# Patient Record
Sex: FEMALE | Race: WHITE | Employment: FULL TIME | URBAN - METROPOLITAN AREA
[De-identification: names, ages, dates, MRNs, and addresses within clinical notes are randomized per-mention and may not be internally consistent; named-entity substitution may affect disease eponyms.]

---

## 2017-03-13 ENCOUNTER — TELEPHONE (OUTPATIENT)
Dept: INTERNAL MEDICINE CLINIC | Facility: CLINIC | Age: 32
End: 2017-03-13

## 2017-03-13 NOTE — TELEPHONE ENCOUNTER
Reason for Call/Chief Complaint:  Cough / low back pain   Onset:  Friday  Nursing Assessment/Associated Symptoms:  Pt started having a cough and low back pain Friday that lasted all weekend. This morning pt has had some diarrhea, 3 episodes so far.   Pt is

## 2017-03-13 NOTE — TELEPHONE ENCOUNTER
Most likely diarrheal an upper respiratory illness related to a virus infection. Usually resolves in about 4-7 days. Drink plenty of fluids like Gatorade and light diet–brat diet. Claritin 10 mg 1 tablet once daily.   If any persistent diarrhea will need

## 2017-03-13 NOTE — TELEPHONE ENCOUNTER
Pt states that she has a cold for the last 4 days. Pt states that she also has diarrhea and would like to know what doctor recommends, before making an appt.

## 2017-03-13 NOTE — TELEPHONE ENCOUNTER
Pt notified of RUMA message below. Pt says she has not had any symptoms since this morning and therefore does not want to keep appt for tomorrow. Pt says she will call back if symptoms return.

## 2017-11-08 ENCOUNTER — OFFICE VISIT (OUTPATIENT)
Dept: INTERNAL MEDICINE CLINIC | Facility: CLINIC | Age: 32
End: 2017-11-08

## 2017-11-08 VITALS
HEIGHT: 70 IN | SYSTOLIC BLOOD PRESSURE: 96 MMHG | HEART RATE: 78 BPM | WEIGHT: 146 LBS | BODY MASS INDEX: 20.9 KG/M2 | DIASTOLIC BLOOD PRESSURE: 64 MMHG

## 2017-11-08 DIAGNOSIS — Z00.00 ROUTINE GENERAL MEDICAL EXAMINATION AT A HEALTH CARE FACILITY: Primary | ICD-10-CM

## 2017-11-08 PROCEDURE — 99395 PREV VISIT EST AGE 18-39: CPT | Performed by: INTERNAL MEDICINE

## 2017-11-09 NOTE — PROGRESS NOTES
Eileen Encarnacion is a 29year old female.   Patient presents with:  Physical      HPI:   Pt is a 30 yo woman comes for physical  C/c physical  C/o   After breast feeding -- took 3 mns for period to return and then she period 3-4 days - painful ,heavy ,no cl Position: Sitting, Cuff Size: adult)   Pulse 78   Ht 5' 10\" (1.778 m)   Wt 146 lb (66.2 kg)   LMP 10/15/2017   BMI 20.95 kg/m²   GENERAL: well developed, well nourished,in no apparent distress  SKIN: no rashes,no suspicious lesions  HEENT: atraumatic, nor

## 2017-12-28 ENCOUNTER — TELEPHONE (OUTPATIENT)
Dept: INTERNAL MEDICINE CLINIC | Facility: CLINIC | Age: 32
End: 2017-12-28

## 2017-12-29 NOTE — TELEPHONE ENCOUNTER
Please advise on message.   Last OV with you was 10/28/2016, Last ov was with Dr. Ryan Edwards 11/8/2017

## 2018-01-02 NOTE — TELEPHONE ENCOUNTER
FYI: per pt, she saw Dr Oracio Machado RUMA,  Pt would like to know if msg can send to Dr Padmini Ivey since she discuss it to her already about this issue. Pls advise.

## 2018-01-03 NOTE — TELEPHONE ENCOUNTER
Dr Lito Saenz please see request to check estrogen. Pt saw you 11/8, will remind her to have other labs checked as well.

## 2018-02-03 ENCOUNTER — OFFICE VISIT (OUTPATIENT)
Dept: INTERNAL MEDICINE CLINIC | Facility: CLINIC | Age: 33
End: 2018-02-03

## 2018-02-03 ENCOUNTER — LAB ENCOUNTER (OUTPATIENT)
Dept: LAB | Facility: HOSPITAL | Age: 33
End: 2018-02-03
Attending: INTERNAL MEDICINE
Payer: COMMERCIAL

## 2018-02-03 VITALS
HEART RATE: 61 BPM | HEIGHT: 70 IN | SYSTOLIC BLOOD PRESSURE: 106 MMHG | WEIGHT: 148 LBS | DIASTOLIC BLOOD PRESSURE: 66 MMHG | BODY MASS INDEX: 21.19 KG/M2

## 2018-02-03 DIAGNOSIS — H61.21 IMPACTED CERUMEN OF RIGHT EAR: Primary | ICD-10-CM

## 2018-02-03 DIAGNOSIS — Z00.00 ROUTINE GENERAL MEDICAL EXAMINATION AT A HEALTH CARE FACILITY: ICD-10-CM

## 2018-02-03 LAB
ALBUMIN SERPL BCP-MCNC: 4.1 G/DL (ref 3.5–4.8)
ALBUMIN/GLOB SERPL: 1.5 {RATIO} (ref 1–2)
ALP SERPL-CCNC: 34 U/L (ref 32–100)
ALT SERPL-CCNC: 11 U/L (ref 14–54)
ANION GAP SERPL CALC-SCNC: 10 MMOL/L (ref 0–18)
AST SERPL-CCNC: 14 U/L (ref 15–41)
BASOPHILS # BLD: 0 K/UL (ref 0–0.2)
BASOPHILS NFR BLD: 0 %
BILIRUB SERPL-MCNC: 0.7 MG/DL (ref 0.3–1.2)
BUN SERPL-MCNC: 10 MG/DL (ref 8–20)
BUN/CREAT SERPL: 13.2 (ref 10–20)
CALCIUM SERPL-MCNC: 9 MG/DL (ref 8.5–10.5)
CHLORIDE SERPL-SCNC: 105 MMOL/L (ref 95–110)
CHOLEST SERPL-MCNC: 98 MG/DL (ref 110–200)
CO2 SERPL-SCNC: 24 MMOL/L (ref 22–32)
CREAT SERPL-MCNC: 0.76 MG/DL (ref 0.5–1.5)
EOSINOPHIL # BLD: 0.1 K/UL (ref 0–0.7)
EOSINOPHIL NFR BLD: 2 %
ERYTHROCYTE [DISTWIDTH] IN BLOOD BY AUTOMATED COUNT: 13.1 % (ref 11–15)
GLOBULIN PLAS-MCNC: 2.7 G/DL (ref 2.5–3.7)
GLUCOSE SERPL-MCNC: 96 MG/DL (ref 70–99)
HCT VFR BLD AUTO: 37.2 % (ref 35–48)
HDLC SERPL-MCNC: 51 MG/DL
HGB BLD-MCNC: 12.5 G/DL (ref 12–16)
LDLC SERPL CALC-MCNC: 43 MG/DL (ref 0–99)
LYMPHOCYTES # BLD: 1.1 K/UL (ref 1–4)
LYMPHOCYTES NFR BLD: 27 %
MCH RBC QN AUTO: 29.7 PG (ref 27–32)
MCHC RBC AUTO-ENTMCNC: 33.6 G/DL (ref 32–37)
MCV RBC AUTO: 88.4 FL (ref 80–100)
MONOCYTES # BLD: 0.4 K/UL (ref 0–1)
MONOCYTES NFR BLD: 10 %
NEUTROPHILS # BLD AUTO: 2.6 K/UL (ref 1.8–7.7)
NEUTROPHILS NFR BLD: 62 %
NONHDLC SERPL-MCNC: 47 MG/DL
OSMOLALITY UR CALC.SUM OF ELEC: 287 MOSM/KG (ref 275–295)
PLATELET # BLD AUTO: 131 K/UL (ref 140–400)
PMV BLD AUTO: 10.1 FL (ref 7.4–10.3)
POTASSIUM SERPL-SCNC: 4.2 MMOL/L (ref 3.3–5.1)
PROT SERPL-MCNC: 6.8 G/DL (ref 5.9–8.4)
RBC # BLD AUTO: 4.21 M/UL (ref 3.7–5.4)
SODIUM SERPL-SCNC: 139 MMOL/L (ref 136–144)
TRIGL SERPL-MCNC: 18 MG/DL (ref 1–149)
TSH SERPL-ACNC: 1.64 UIU/ML (ref 0.45–5.33)
VIT B12 SERPL-MCNC: 332 PG/ML (ref 181–914)
WBC # BLD AUTO: 4.3 K/UL (ref 4–11)

## 2018-02-03 PROCEDURE — 99213 OFFICE O/P EST LOW 20 MIN: CPT | Performed by: INTERNAL MEDICINE

## 2018-02-03 PROCEDURE — 82306 VITAMIN D 25 HYDROXY: CPT

## 2018-02-03 PROCEDURE — 36415 COLL VENOUS BLD VENIPUNCTURE: CPT

## 2018-02-03 PROCEDURE — 80061 LIPID PANEL: CPT

## 2018-02-03 PROCEDURE — 99212 OFFICE O/P EST SF 10 MIN: CPT | Performed by: INTERNAL MEDICINE

## 2018-02-03 PROCEDURE — 85025 COMPLETE CBC W/AUTO DIFF WBC: CPT

## 2018-02-03 PROCEDURE — 84443 ASSAY THYROID STIM HORMONE: CPT

## 2018-02-03 PROCEDURE — 82607 VITAMIN B-12: CPT

## 2018-02-03 PROCEDURE — 80053 COMPREHEN METABOLIC PANEL: CPT

## 2018-02-03 NOTE — PROGRESS NOTES
Nellie Rodriguez is a 29year old female. Patient presents with:  Ear Wax    HPI:   At her recent physical, it was noted that she had some wax buildup in her right ear. Recently she has felt that her right ear is \"clogged. \"  She has noted some hearing l

## 2018-02-05 LAB — 25(OH)D3 SERPL-MCNC: 21.1 NG/ML

## 2018-02-13 ENCOUNTER — TELEPHONE (OUTPATIENT)
Dept: OBGYN CLINIC | Facility: CLINIC | Age: 33
End: 2018-02-13

## 2018-02-13 DIAGNOSIS — O20.0 THREATENED ABORTION, ANTEPARTUM: Primary | ICD-10-CM

## 2018-02-14 NOTE — TELEPHONE ENCOUNTER
Pt calling to report LMP of 1/11/18 and +HPT. Pt stated that she usually has regular cycles, usually begin on the 1st of each month. Pt stated that for the past 2 months her cycles have becoming \"somewhat longer\".  Pt stated that in December her period wa

## 2018-02-15 ENCOUNTER — APPOINTMENT (OUTPATIENT)
Dept: LAB | Facility: HOSPITAL | Age: 33
End: 2018-02-15
Attending: OBSTETRICS & GYNECOLOGY
Payer: COMMERCIAL

## 2018-02-15 ENCOUNTER — TELEPHONE (OUTPATIENT)
Dept: OBGYN CLINIC | Facility: CLINIC | Age: 33
End: 2018-02-15

## 2018-02-15 DIAGNOSIS — O20.0 THREATENED ABORTION, ANTEPARTUM: ICD-10-CM

## 2018-02-15 LAB
B-HCG SERPL-ACNC: 342.5 MIU/ML
RH BLOOD TYPE: POSITIVE

## 2018-02-15 PROCEDURE — 86900 BLOOD TYPING SEROLOGIC ABO: CPT

## 2018-02-15 PROCEDURE — 84702 CHORIONIC GONADOTROPIN TEST: CPT

## 2018-02-15 PROCEDURE — 86901 BLOOD TYPING SEROLOGIC RH(D): CPT

## 2018-02-15 PROCEDURE — 36415 COLL VENOUS BLD VENIPUNCTURE: CPT

## 2018-02-15 NOTE — TELEPHONE ENCOUNTER
Per LYNETTE pt to also have blood type drawn when she comes in for first [address]. Pt advised of all LYNETTE's recs and states understanding.

## 2018-02-15 NOTE — TELEPHONE ENCOUNTER
Was advised to come in for quant and repeat in 48 hrs. States does not want to \"sit in traffic and works [place] the city\" and will \"work better with work schedule\" to come in on Saturday for first [address] and repeat it on Monday when she is off from work.

## 2018-02-15 NOTE — TELEPHONE ENCOUNTER
Informed pt of below, she will \"try to go later tonight\". Advised her to come back for second quant Sunday morning since the lab will be closed saturday night. Pt states she will try.

## 2018-02-15 NOTE — TELEPHONE ENCOUNTER
The pt states that she is suppose to come in today for lab work, but would like to know if it is ok for her to wait until Saturday to have it done. Please advise.

## 2018-02-15 NOTE — TELEPHONE ENCOUNTER
The risk in waiting is not knowing blood type and Ab screen to see if Rhogam is indicated. I recommend she come ASAP. Let her know.

## 2018-02-18 ENCOUNTER — TELEPHONE (OUTPATIENT)
Dept: OBGYN CLINIC | Facility: CLINIC | Age: 33
End: 2018-02-18

## 2018-02-18 ENCOUNTER — APPOINTMENT (OUTPATIENT)
Dept: LAB | Facility: HOSPITAL | Age: 33
End: 2018-02-18
Attending: OBSTETRICS & GYNECOLOGY
Payer: COMMERCIAL

## 2018-02-18 ENCOUNTER — PATIENT MESSAGE (OUTPATIENT)
Dept: INTERNAL MEDICINE CLINIC | Facility: CLINIC | Age: 33
End: 2018-02-18

## 2018-02-18 DIAGNOSIS — O20.0 THREATENED ABORTION, ANTEPARTUM: ICD-10-CM

## 2018-02-18 DIAGNOSIS — O20.0 THREATENED ABORTION, ANTEPARTUM: Primary | ICD-10-CM

## 2018-02-18 LAB — B-HCG SERPL-ACNC: 380.6 MIU/ML

## 2018-02-18 PROCEDURE — 84702 CHORIONIC GONADOTROPIN TEST: CPT

## 2018-02-18 PROCEDURE — 36415 COLL VENOUS BLD VENIPUNCTURE: CPT

## 2018-02-18 NOTE — TELEPHONE ENCOUNTER
Patient presented to  at [facility]. She was supposed to have 2nd HCG level and states no order. I reviewed order from 02-15-18. I can't tell if this was placed as a standing order or not.  Regardless, I placed order for second HCG and patient will be d

## 2018-02-21 ENCOUNTER — TELEPHONE (OUTPATIENT)
Dept: OTHER | Age: 33
End: 2018-02-21

## 2018-02-21 NOTE — TELEPHONE ENCOUNTER
Pt returned call. Pt states she can contact Dr. Saleem Garzon via [address] I advised to call Dr. Masood Smith office directly so that they can assist her. Number given to Pt.

## 2018-02-21 NOTE — TELEPHONE ENCOUNTER
Pt contacted and states that she just spoke with someone in the office and was informed of the message to contact Dr. Del Stokes office directly. Pt verbalizes understanding and states that the vaginal bleeding has stopped and that she feels fine.

## 2018-02-21 NOTE — TELEPHONE ENCOUNTER
From: Ronal España  To: Greyson Villa MD  Sent: 2/18/2018 8:52 AM CST  Subject: Referral Request    Dear Dr. Nicki Alvarez,    Dr. Elayne Mercado is going to be my Obgyn. I can’t send him a message on my chart. Please let him know.  After heavy implantation bleeding

## 2018-02-21 NOTE — TELEPHONE ENCOUNTER
Raleigh Vickers,  I tried calling you by phone and left a message. Please contact Dr. Luís Falcon office directly for assistance. If you need advise please call our office directly so we can further assist you at [phone].     [address]

## 2018-02-21 NOTE — TELEPHONE ENCOUNTER
REY..Dr. Jose Lindo, See [facility] message, I asked pt to call Dr. Christoph Contreras office directly so their nurse can assist her but if she needs us to please call us. I also tried calling her at home and was only able to leave a message.

## 2018-02-22 ENCOUNTER — OFFICE VISIT (OUTPATIENT)
Dept: OBGYN CLINIC | Facility: CLINIC | Age: 33
End: 2018-02-22

## 2018-02-22 ENCOUNTER — TELEPHONE (OUTPATIENT)
Dept: OBGYN CLINIC | Facility: CLINIC | Age: 33
End: 2018-02-22

## 2018-02-22 ENCOUNTER — APPOINTMENT (OUTPATIENT)
Dept: LAB | Facility: HOSPITAL | Age: 33
End: 2018-02-22
Attending: OBSTETRICS & GYNECOLOGY
Payer: COMMERCIAL

## 2018-02-22 VITALS
WEIGHT: 147 LBS | HEART RATE: 74 BPM | SYSTOLIC BLOOD PRESSURE: 115 MMHG | DIASTOLIC BLOOD PRESSURE: 75 MMHG | BODY MASS INDEX: 21 KG/M2

## 2018-02-22 DIAGNOSIS — O20.0 THREATENED ABORTION, ANTEPARTUM: ICD-10-CM

## 2018-02-22 DIAGNOSIS — O20.0 THREATENED ABORTION, ANTEPARTUM: Primary | ICD-10-CM

## 2018-02-22 DIAGNOSIS — O03.4 INCOMPLETE SPONTANEOUS ABORTION: Primary | ICD-10-CM

## 2018-02-22 LAB
ALBUMIN SERPL BCP-MCNC: 4.7 G/DL (ref 3.5–4.8)
ALBUMIN/GLOB SERPL: 1.6 {RATIO} (ref 1–2)
ALP SERPL-CCNC: 36 U/L (ref 32–100)
ALT SERPL-CCNC: 12 U/L (ref 14–54)
ANION GAP SERPL CALC-SCNC: 9 MMOL/L (ref 0–18)
AST SERPL-CCNC: 14 U/L (ref 15–41)
B-HCG SERPL-ACNC: 86.5 MIU/ML
BILIRUB SERPL-MCNC: 1 MG/DL (ref 0.3–1.2)
BUN SERPL-MCNC: 6 MG/DL (ref 8–20)
BUN/CREAT SERPL: 8.2 (ref 10–20)
CALCIUM SERPL-MCNC: 9.5 MG/DL (ref 8.5–10.5)
CHLORIDE SERPL-SCNC: 107 MMOL/L (ref 95–110)
CO2 SERPL-SCNC: 24 MMOL/L (ref 22–32)
CREAT SERPL-MCNC: 0.73 MG/DL (ref 0.5–1.5)
ERYTHROCYTE [DISTWIDTH] IN BLOOD BY AUTOMATED COUNT: 13.1 % (ref 11–15)
GLOBULIN PLAS-MCNC: 3 G/DL (ref 2.5–3.7)
GLUCOSE SERPL-MCNC: 90 MG/DL (ref 70–99)
HCT VFR BLD AUTO: 41 % (ref 35–48)
HGB BLD-MCNC: 13.8 G/DL (ref 12–16)
MCH RBC QN AUTO: 30 PG (ref 27–32)
MCHC RBC AUTO-ENTMCNC: 33.7 G/DL (ref 32–37)
MCV RBC AUTO: 88.9 FL (ref 80–100)
OSMOLALITY UR CALC.SUM OF ELEC: 287 MOSM/KG (ref 275–295)
PATIENT FASTING: NO
PLATELET # BLD AUTO: 152 K/UL (ref 140–400)
PMV BLD AUTO: 9.7 FL (ref 7.4–10.3)
POTASSIUM SERPL-SCNC: 4 MMOL/L (ref 3.3–5.1)
PROT SERPL-MCNC: 7.7 G/DL (ref 5.9–8.4)
RBC # BLD AUTO: 4.61 M/UL (ref 3.7–5.4)
SODIUM SERPL-SCNC: 140 MMOL/L (ref 136–144)
WBC # BLD AUTO: 6.8 K/UL (ref 4–11)

## 2018-02-22 PROCEDURE — 80053 COMPREHEN METABOLIC PANEL: CPT

## 2018-02-22 PROCEDURE — 85027 COMPLETE CBC AUTOMATED: CPT

## 2018-02-22 PROCEDURE — 84702 CHORIONIC GONADOTROPIN TEST: CPT

## 2018-02-22 PROCEDURE — 99213 OFFICE O/P EST LOW 20 MIN: CPT | Performed by: OBSTETRICS & GYNECOLOGY

## 2018-02-22 PROCEDURE — 36415 COLL VENOUS BLD VENIPUNCTURE: CPT

## 2018-02-22 RX ORDER — PRENATAL VIT/IRON FUM/FOLIC AC 27MG-0.8MG
1 TABLET ORAL DAILY
COMMUNITY

## 2018-02-22 NOTE — TELEPHONE ENCOUNTER
Pt accepted appt with LYNETTE in [place] at 10:20am this morning. Pt provided with [place] address. Pt advised to call with any worsening bleeding or any concerns prior to appt. Pt verbalized understanding.

## 2018-02-22 NOTE — PROGRESS NOTES
HPI:    Patient ID: Bettie Canchola is a 29year old female. HPI   with sure LMP of 18 but menses q 21-42 days prior to that. Positive HPT on .  Spotting - and then post coital spotting after IC about 2 weeks later but over 3 days. With no pain, we can draw HCG again. I discussed possible non-viable uterine pregnancy vs ectopic. Strict instructions to call day or night with pain. We lastly discussed possible MTX if level is still hovering.  I explained method, indications

## 2018-02-22 NOTE — TELEPHONE ENCOUNTER
Paged while on call. Pt with brown spotting when wiping this morning. She denies heavy bleeding. She had bleeding last week after intercourse and inappropriate rise of HCG. She denies pain, lightheadedness and dizziness.  Reviewed patient needs to be seen i

## 2018-02-22 NOTE — TELEPHONE ENCOUNTER
I discussed HCG level decrease to 86. This is good news. I advised weekly quants to zero and new order sent. Maintain pain / bleeding instructions TCB.

## 2018-03-03 ENCOUNTER — TELEPHONE (OUTPATIENT)
Dept: OBGYN CLINIC | Facility: CLINIC | Age: 33
End: 2018-03-03

## 2018-03-03 ENCOUNTER — APPOINTMENT (OUTPATIENT)
Dept: LAB | Facility: HOSPITAL | Age: 33
End: 2018-03-03
Attending: OBSTETRICS & GYNECOLOGY
Payer: COMMERCIAL

## 2018-03-03 ENCOUNTER — APPOINTMENT (OUTPATIENT)
Dept: ULTRASOUND IMAGING | Facility: HOSPITAL | Age: 33
End: 2018-03-03
Attending: EMERGENCY MEDICINE
Payer: COMMERCIAL

## 2018-03-03 ENCOUNTER — HOSPITAL ENCOUNTER (OUTPATIENT)
Facility: HOSPITAL | Age: 33
Setting detail: OBSERVATION
Discharge: HOME | End: 2018-03-04
Attending: EMERGENCY MEDICINE | Admitting: OBSTETRICS & GYNECOLOGY
Payer: COMMERCIAL

## 2018-03-03 DIAGNOSIS — N83.202 CYSTS OF BOTH OVARIES: ICD-10-CM

## 2018-03-03 DIAGNOSIS — R10.2 PELVIC PAIN AFFECTING PREGNANCY: ICD-10-CM

## 2018-03-03 DIAGNOSIS — N83.201 CYSTS OF BOTH OVARIES: ICD-10-CM

## 2018-03-03 DIAGNOSIS — O26.899 PELVIC PAIN AFFECTING PREGNANCY, ANTEPARTUM: Primary | ICD-10-CM

## 2018-03-03 DIAGNOSIS — N83.201 BILATERAL OVARIAN CYSTS: ICD-10-CM

## 2018-03-03 DIAGNOSIS — R10.2 PELVIC PAIN AFFECTING PREGNANCY, ANTEPARTUM: Primary | ICD-10-CM

## 2018-03-03 DIAGNOSIS — N83.202 BILATERAL OVARIAN CYSTS: ICD-10-CM

## 2018-03-03 DIAGNOSIS — O26.899 PELVIC PAIN AFFECTING PREGNANCY: ICD-10-CM

## 2018-03-03 DIAGNOSIS — O03.4 INCOMPLETE SPONTANEOUS ABORTION: ICD-10-CM

## 2018-03-03 LAB
B-HCG SERPL-ACNC: 221.4 MIU/ML
ERYTHROCYTE [DISTWIDTH] IN BLOOD BY AUTOMATED COUNT: 12.7 % (ref 11–15)
HCT VFR BLD AUTO: 35.4 % (ref 35–48)
HGB BLD-MCNC: 12.1 G/DL (ref 12–16)
MCH RBC QN AUTO: 30.1 PG (ref 27–32)
MCHC RBC AUTO-ENTMCNC: 34.1 G/DL (ref 32–37)
MCV RBC AUTO: 88.3 FL (ref 80–100)
PLATELET # BLD AUTO: 139 K/UL (ref 140–400)
PMV BLD AUTO: 9.9 FL (ref 7.4–10.3)
RBC # BLD AUTO: 4.01 M/UL (ref 3.7–5.4)
WBC # BLD AUTO: 8 K/UL (ref 4–11)

## 2018-03-03 PROCEDURE — 76801 OB US < 14 WKS SINGLE FETUS: CPT | Performed by: EMERGENCY MEDICINE

## 2018-03-03 PROCEDURE — 84702 CHORIONIC GONADOTROPIN TEST: CPT

## 2018-03-03 PROCEDURE — 93975 VASCULAR STUDY: CPT | Performed by: EMERGENCY MEDICINE

## 2018-03-03 PROCEDURE — 36415 COLL VENOUS BLD VENIPUNCTURE: CPT

## 2018-03-03 PROCEDURE — 76817 TRANSVAGINAL US OBSTETRIC: CPT | Performed by: EMERGENCY MEDICINE

## 2018-03-03 RX ORDER — IBUPROFEN 600 MG/1
600 TABLET ORAL ONCE
Status: DISCONTINUED | OUTPATIENT
Start: 2018-03-03 | End: 2018-03-03

## 2018-03-03 RX ORDER — DEXTROSE, SODIUM CHLORIDE, SODIUM LACTATE, POTASSIUM CHLORIDE, AND CALCIUM CHLORIDE 5; .6; .31; .03; .02 G/100ML; G/100ML; G/100ML; G/100ML; G/100ML
INJECTION, SOLUTION INTRAVENOUS CONTINUOUS
Status: DISCONTINUED | OUTPATIENT
Start: 2018-03-03 | End: 2018-03-04

## 2018-03-03 RX ORDER — ACETAMINOPHEN 500 MG
1000 TABLET ORAL ONCE
Status: COMPLETED | OUTPATIENT
Start: 2018-03-03 | End: 2018-03-03

## 2018-03-04 ENCOUNTER — ANESTHESIA (OUTPATIENT)
Dept: SURGERY | Facility: HOSPITAL | Age: 33
End: 2018-03-04
Payer: COMMERCIAL

## 2018-03-04 ENCOUNTER — SURGERY (OUTPATIENT)
Age: 33
End: 2018-03-04

## 2018-03-04 ENCOUNTER — ANESTHESIA EVENT (OUTPATIENT)
Dept: SURGERY | Facility: HOSPITAL | Age: 33
End: 2018-03-04
Payer: COMMERCIAL

## 2018-03-04 ENCOUNTER — TELEPHONE (OUTPATIENT)
Dept: OBGYN CLINIC | Facility: CLINIC | Age: 33
End: 2018-03-04

## 2018-03-04 VITALS
SYSTOLIC BLOOD PRESSURE: 114 MMHG | TEMPERATURE: 97 F | WEIGHT: 145 LBS | OXYGEN SATURATION: 100 % | RESPIRATION RATE: 18 BRPM | HEART RATE: 78 BPM | HEIGHT: 69 IN | BODY MASS INDEX: 21.48 KG/M2 | DIASTOLIC BLOOD PRESSURE: 55 MMHG

## 2018-03-04 PROBLEM — O26.899 PELVIC PAIN AFFECTING PREGNANCY, ANTEPARTUM: Status: ACTIVE | Noted: 2018-03-04

## 2018-03-04 PROBLEM — O00.101 RIGHT TUBAL PREGNANCY WITHOUT INTRAUTERINE PREGNANCY: Status: ACTIVE | Noted: 2018-03-04

## 2018-03-04 PROBLEM — N83.202 CYSTS OF BOTH OVARIES: Status: ACTIVE | Noted: 2018-03-04

## 2018-03-04 PROBLEM — R10.2 PELVIC PAIN AFFECTING PREGNANCY, ANTEPARTUM: Status: ACTIVE | Noted: 2018-03-04

## 2018-03-04 PROBLEM — N83.201 CYSTS OF BOTH OVARIES: Status: ACTIVE | Noted: 2018-03-04

## 2018-03-04 LAB
ANTIBODY SCREEN: NEGATIVE
RH BLOOD TYPE: POSITIVE

## 2018-03-04 PROCEDURE — 58662 LAPAROSCOPY EXCISE LESIONS: CPT | Performed by: OBSTETRICS & GYNECOLOGY

## 2018-03-04 PROCEDURE — 0UB24ZZ EXCISION OF BILATERAL OVARIES, PERCUTANEOUS ENDOSCOPIC APPROACH: ICD-10-PCS | Performed by: OBSTETRICS & GYNECOLOGY

## 2018-03-04 PROCEDURE — 0UT54ZZ RESECTION OF RIGHT FALLOPIAN TUBE, PERCUTANEOUS ENDOSCOPIC APPROACH: ICD-10-PCS | Performed by: OBSTETRICS & GYNECOLOGY

## 2018-03-04 PROCEDURE — 10T24ZZ RESECTION OF PRODUCTS OF CONCEPTION, ECTOPIC, PERCUTANEOUS ENDOSCOPIC APPROACH: ICD-10-PCS | Performed by: OBSTETRICS & GYNECOLOGY

## 2018-03-04 PROCEDURE — 59151 TREAT ECTOPIC PREGNANCY: CPT | Performed by: OBSTETRICS & GYNECOLOGY

## 2018-03-04 RX ORDER — HYDROCODONE BITARTRATE AND ACETAMINOPHEN 5; 325 MG/1; MG/1
1 TABLET ORAL AS NEEDED
Status: DISCONTINUED | OUTPATIENT
Start: 2018-03-04 | End: 2018-03-04 | Stop reason: HOSPADM

## 2018-03-04 RX ORDER — MORPHINE SULFATE 10 MG/ML
6 INJECTION, SOLUTION INTRAMUSCULAR; INTRAVENOUS EVERY 10 MIN PRN
Status: DISCONTINUED | OUTPATIENT
Start: 2018-03-04 | End: 2018-03-04 | Stop reason: HOSPADM

## 2018-03-04 RX ORDER — CLINDAMYCIN PHOSPHATE 150 MG/ML
INJECTION, SOLUTION INTRAVENOUS AS NEEDED
Status: DISCONTINUED | OUTPATIENT
Start: 2018-03-04 | End: 2018-03-04 | Stop reason: SURG

## 2018-03-04 RX ORDER — SODIUM CHLORIDE, SODIUM LACTATE, POTASSIUM CHLORIDE, CALCIUM CHLORIDE 600; 310; 30; 20 MG/100ML; MG/100ML; MG/100ML; MG/100ML
INJECTION, SOLUTION INTRAVENOUS CONTINUOUS PRN
Status: DISCONTINUED | OUTPATIENT
Start: 2018-03-04 | End: 2018-03-04 | Stop reason: SURG

## 2018-03-04 RX ORDER — CLINDAMYCIN PHOSPHATE 900 MG/50ML
900 INJECTION INTRAVENOUS ONCE
Status: DISCONTINUED | OUTPATIENT
Start: 2018-03-04 | End: 2018-03-04 | Stop reason: HOSPADM

## 2018-03-04 RX ORDER — SODIUM CHLORIDE, SODIUM LACTATE, POTASSIUM CHLORIDE, CALCIUM CHLORIDE 600; 310; 30; 20 MG/100ML; MG/100ML; MG/100ML; MG/100ML
INJECTION, SOLUTION INTRAVENOUS CONTINUOUS
Status: DISCONTINUED | OUTPATIENT
Start: 2018-03-04 | End: 2018-03-04 | Stop reason: HOSPADM

## 2018-03-04 RX ORDER — LIDOCAINE HYDROCHLORIDE 10 MG/ML
INJECTION, SOLUTION EPIDURAL; INFILTRATION; INTRACAUDAL; PERINEURAL AS NEEDED
Status: DISCONTINUED | OUTPATIENT
Start: 2018-03-04 | End: 2018-03-04 | Stop reason: SURG

## 2018-03-04 RX ORDER — SODIUM CHLORIDE 0.9 % (FLUSH) 0.9 %
10 SYRINGE (ML) INJECTION AS NEEDED
Status: DISCONTINUED | OUTPATIENT
Start: 2018-03-04 | End: 2018-03-04 | Stop reason: HOSPADM

## 2018-03-04 RX ORDER — LABETALOL HYDROCHLORIDE 5 MG/ML
INJECTION, SOLUTION INTRAVENOUS AS NEEDED
Status: DISCONTINUED | OUTPATIENT
Start: 2018-03-04 | End: 2018-03-04 | Stop reason: SURG

## 2018-03-04 RX ORDER — MIDAZOLAM HYDROCHLORIDE 1 MG/ML
INJECTION INTRAMUSCULAR; INTRAVENOUS AS NEEDED
Status: DISCONTINUED | OUTPATIENT
Start: 2018-03-04 | End: 2018-03-04 | Stop reason: SURG

## 2018-03-04 RX ORDER — DEXAMETHASONE SODIUM PHOSPHATE 4 MG/ML
VIAL (ML) INJECTION AS NEEDED
Status: DISCONTINUED | OUTPATIENT
Start: 2018-03-04 | End: 2018-03-04 | Stop reason: SURG

## 2018-03-04 RX ORDER — ONDANSETRON 2 MG/ML
INJECTION INTRAMUSCULAR; INTRAVENOUS AS NEEDED
Status: DISCONTINUED | OUTPATIENT
Start: 2018-03-04 | End: 2018-03-04 | Stop reason: SURG

## 2018-03-04 RX ORDER — BUPIVACAINE HYDROCHLORIDE AND EPINEPHRINE 5; 5 MG/ML; UG/ML
INJECTION, SOLUTION PERINEURAL AS NEEDED
Status: DISCONTINUED | OUTPATIENT
Start: 2018-03-04 | End: 2018-03-04 | Stop reason: HOSPADM

## 2018-03-04 RX ORDER — IBUPROFEN 600 MG/1
600 TABLET ORAL EVERY 6 HOURS PRN
Qty: 30 TABLET | Refills: 0 | Status: SHIPPED | OUTPATIENT
Start: 2018-03-04 | End: 2018-07-16 | Stop reason: ALTCHOICE

## 2018-03-04 RX ORDER — ONDANSETRON 2 MG/ML
4 INJECTION INTRAMUSCULAR; INTRAVENOUS EVERY 8 HOURS PRN
Status: DISCONTINUED | OUTPATIENT
Start: 2018-03-04 | End: 2018-03-04

## 2018-03-04 RX ORDER — ONDANSETRON 4 MG/1
4 TABLET, FILM COATED ORAL EVERY 8 HOURS PRN
Status: DISCONTINUED | OUTPATIENT
Start: 2018-03-04 | End: 2018-03-04

## 2018-03-04 RX ORDER — SODIUM CHLORIDE, SODIUM LACTATE, POTASSIUM CHLORIDE, CALCIUM CHLORIDE 600; 310; 30; 20 MG/100ML; MG/100ML; MG/100ML; MG/100ML
INJECTION, SOLUTION INTRAVENOUS
Status: COMPLETED
Start: 2018-03-04 | End: 2018-03-04

## 2018-03-04 RX ORDER — ROCURONIUM BROMIDE 10 MG/ML
INJECTION, SOLUTION INTRAVENOUS AS NEEDED
Status: DISCONTINUED | OUTPATIENT
Start: 2018-03-04 | End: 2018-03-04 | Stop reason: SURG

## 2018-03-04 RX ORDER — ONDANSETRON 2 MG/ML
4 INJECTION INTRAMUSCULAR; INTRAVENOUS ONCE AS NEEDED
Status: COMPLETED | OUTPATIENT
Start: 2018-03-04 | End: 2018-03-04

## 2018-03-04 RX ORDER — NALOXONE HYDROCHLORIDE 0.4 MG/ML
80 INJECTION, SOLUTION INTRAMUSCULAR; INTRAVENOUS; SUBCUTANEOUS AS NEEDED
Status: DISCONTINUED | OUTPATIENT
Start: 2018-03-04 | End: 2018-03-04 | Stop reason: HOSPADM

## 2018-03-04 RX ORDER — KETOROLAC TROMETHAMINE 30 MG/ML
30 INJECTION, SOLUTION INTRAMUSCULAR; INTRAVENOUS ONCE
Status: COMPLETED | OUTPATIENT
Start: 2018-03-04 | End: 2018-03-04

## 2018-03-04 RX ORDER — IBUPROFEN 600 MG/1
600 TABLET ORAL EVERY 6 HOURS PRN
Status: DISCONTINUED | OUTPATIENT
Start: 2018-03-04 | End: 2018-03-04

## 2018-03-04 RX ORDER — GLYCOPYRROLATE 0.2 MG/ML
INJECTION INTRAMUSCULAR; INTRAVENOUS AS NEEDED
Status: DISCONTINUED | OUTPATIENT
Start: 2018-03-04 | End: 2018-03-04 | Stop reason: SURG

## 2018-03-04 RX ORDER — NEOSTIGMINE METHYLSULFATE 0.5 MG/ML
INJECTION INTRAVENOUS AS NEEDED
Status: DISCONTINUED | OUTPATIENT
Start: 2018-03-04 | End: 2018-03-04 | Stop reason: SURG

## 2018-03-04 RX ORDER — MORPHINE SULFATE 2 MG/ML
2 INJECTION, SOLUTION INTRAMUSCULAR; INTRAVENOUS EVERY 10 MIN PRN
Status: DISCONTINUED | OUTPATIENT
Start: 2018-03-04 | End: 2018-03-04 | Stop reason: HOSPADM

## 2018-03-04 RX ORDER — MORPHINE SULFATE 4 MG/ML
4 INJECTION, SOLUTION INTRAMUSCULAR; INTRAVENOUS EVERY 10 MIN PRN
Status: DISCONTINUED | OUTPATIENT
Start: 2018-03-04 | End: 2018-03-04 | Stop reason: HOSPADM

## 2018-03-04 RX ORDER — HYDROCODONE BITARTRATE AND ACETAMINOPHEN 5; 325 MG/1; MG/1
2 TABLET ORAL AS NEEDED
Status: DISCONTINUED | OUTPATIENT
Start: 2018-03-04 | End: 2018-03-04 | Stop reason: HOSPADM

## 2018-03-04 RX ADMIN — CLINDAMYCIN PHOSPHATE 900 MG: 150 INJECTION, SOLUTION INTRAVENOUS at 08:50:00

## 2018-03-04 RX ADMIN — SODIUM CHLORIDE, SODIUM LACTATE, POTASSIUM CHLORIDE, CALCIUM CHLORIDE: 600; 310; 30; 20 INJECTION, SOLUTION INTRAVENOUS at 08:39:00

## 2018-03-04 RX ADMIN — LIDOCAINE HYDROCHLORIDE 50 MG: 10 INJECTION, SOLUTION EPIDURAL; INFILTRATION; INTRACAUDAL; PERINEURAL at 08:43:00

## 2018-03-04 RX ADMIN — GLYCOPYRROLATE 0.6 MG: 0.2 INJECTION INTRAMUSCULAR; INTRAVENOUS at 10:18:00

## 2018-03-04 RX ADMIN — MIDAZOLAM HYDROCHLORIDE 2 MG: 1 INJECTION INTRAMUSCULAR; INTRAVENOUS at 08:39:00

## 2018-03-04 RX ADMIN — ROCURONIUM BROMIDE 10 MG: 10 INJECTION, SOLUTION INTRAVENOUS at 09:34:00

## 2018-03-04 RX ADMIN — ONDANSETRON 4 MG: 2 INJECTION INTRAMUSCULAR; INTRAVENOUS at 08:43:00

## 2018-03-04 RX ADMIN — SODIUM CHLORIDE, SODIUM LACTATE, POTASSIUM CHLORIDE, CALCIUM CHLORIDE: 600; 310; 30; 20 INJECTION, SOLUTION INTRAVENOUS at 10:20:00

## 2018-03-04 RX ADMIN — NEOSTIGMINE METHYLSULFATE 4 MG: 0.5 INJECTION INTRAVENOUS at 10:18:00

## 2018-03-04 RX ADMIN — LABETALOL HYDROCHLORIDE 5 MG: 5 INJECTION, SOLUTION INTRAVENOUS at 09:46:00

## 2018-03-04 RX ADMIN — ROCURONIUM BROMIDE 40 MG: 10 INJECTION, SOLUTION INTRAVENOUS at 08:43:00

## 2018-03-04 RX ADMIN — GLYCOPYRROLATE 0.2 MG: 0.2 INJECTION INTRAMUSCULAR; INTRAVENOUS at 08:43:00

## 2018-03-04 RX ADMIN — DEXAMETHASONE SODIUM PHOSPHATE 4 MG: 4 MG/ML VIAL (ML) INJECTION at 08:43:00

## 2018-03-04 NOTE — PLAN OF CARE
Patient/Family Goals    • Patient/Family Long Term Goal Completed          COPING    • Pt/Family able to verbalize concerns and demonstrate effective coping strategies Progressing        DISCHARGE PLANNING    • Discharge to home or other facility with appr

## 2018-03-04 NOTE — ANESTHESIA PREPROCEDURE EVALUATION
Anesthesia PreOp Note    HPI:     Nellie Rodriguez is a 29year old female who presents for preoperative consultation requested by: Yeimi Martino DO    Date of Surgery: 3/3/2018 - 3/4/2018    Procedure(s):  LAPAROSCOPIC OVARIAN CYSTECTOMY  Indication: Gastro-Intestinal Disorder Paternal Grandfather      Crohn's disease   • Dermoid cyst of ovary [OTHER] Maternal Aunt    • Diabetes Maternal Uncle    • Obesity Maternal Uncle      Morbid obesity       Social History  Social History   Marital status: Single II  Neck ROM: full  Dental - normal exam     Pulmonary - negative ROS and normal exam   Cardiovascular - negative ROS and normal exam    Neuro/Psych - negative ROS     GI/Hepatic/Renal - negative ROS     Endo/Other - negative ROS   Abdominal  - normal exam

## 2018-03-04 NOTE — ED INITIAL ASSESSMENT (HPI)
Sent by Dr. Evelina Lucero for r/o ectopic. Pt having serial beta quants which have been decreasing but todays level was up from prior and pt c/o abd cramping.

## 2018-03-04 NOTE — INTERVAL H&P NOTE
Pre-op Diagnosis: Bilateral ovarian cysts [N83.201, N83.202]  Pelvic pain affecting pregnancy [O26.899, R10.2]    The above referenced H&P was reviewed by Jalen Martinez DO on 3/4/2018, the patient was examined and no significant changes have occurred i

## 2018-03-04 NOTE — ED PROVIDER NOTES
Patient Seen in: [facility] AND [facility] Emergency Department    History   Patient presents with:  Pregnancy Issues (gynecologic)      HPI    Patient presents to the ED after being sent by her OB/GYN for rule out ectopic.   Patient currently pregnant with her s Social History and Family History elements reviewed from today, pertinent positives to the presenting problem noted.     Physical Exam     ED Triage Vitals [03/03/18 1813]  BP: 128/62  Pulse: 84  Resp: 18  Temp: 98 °F (36.7 °C)  Temp src: n/a  SpO2: 99 (1,000 mg Oral Given 3/3/18 2100)         MDM      03/03/18  1813 03/03/18 2045 03/03/18 2100   BP: 128/62 114/71    Pulse: 84 78 89   Resp: 18 15    Temp: 98 °F (36.7 °C)     SpO2: 99% 97% 99%   Weight: 65.8 kg     Height: 175.3 cm (5' 9\")       *I per

## 2018-03-04 NOTE — ANESTHESIA POSTPROCEDURE EVALUATION
Patient: Bettie Canchola    Procedure Summary     Date:  03/04/18 Room / Location:  [address] [place] [address] MAIN OR 04 / [facility] MAIN OR    Anesthesia Start:  5349 Anesthesia Stop:      Procedure:  LAPAROSCOPIC OVARIAN CYSTECTOMY (Bilateral ) Diagnosis:       Bilateral ovarian c

## 2018-03-04 NOTE — BRIEF OP NOTE
Pre-Operative Diagnosis: Bilateral ovarian cysts [N83.201, N83.202]  Pelvic pain affecting pregnancy [O26.899, R10.2]     Post-Operative Diagnosis: Bilateral ovarian cysts [N83.201, N83.202]  Right tubal pregnancy      Procedure Performed:   Procedure(s

## 2018-03-04 NOTE — TELEPHONE ENCOUNTER
Paged on call with minimal bleeding but large increase in cramp abdominal pain. She did have repeat HCG this afternoon and it actually increased from last week. With hovering HCG and increased pain, we have to r/o ectopic.  She is in [facility] and I asked he

## 2018-03-04 NOTE — PROGRESS NOTES
Received patient into room 362 . Shift report received from [facility]. Patient and family oriented to unit, room and call light within reach.  Safety measures in place, POC followed.

## 2018-03-04 NOTE — PLAN OF CARE
COPING    • Pt/Family able to verbalize concerns and demonstrate effective coping strategies Progressing        PAIN - ADULT    • Verbalizes/displays adequate comfort level or patient's stated pain goal Progressing        Patient/Family Goals    • Patient/

## 2018-03-05 NOTE — DISCHARGE SUMMARY
[facility]  Discharge Summary    Maria Luz  Simic Patient Status:  Observation    1985 MRN [id number]   Location [facility] 3SE Attending Yajaira Bull, DO   Hosp Day # 0 PCP Michelle Strauss MD           Date of Admission: 3

## 2018-03-05 NOTE — TELEPHONE ENCOUNTER
Patient had admission for pain and Laparoscopy with bilateral ovarian cystectomies, right salpingectomy for ectopic. Discharged home. Please call her Monday and get her on my schedule 03-16-18 for post op visit. Thanks.

## 2018-03-06 ENCOUNTER — TELEPHONE (OUTPATIENT)
Dept: PEDIATRICS CLINIC | Facility: CLINIC | Age: 33
End: 2018-03-06

## 2018-03-06 NOTE — TELEPHONE ENCOUNTER
Pt is 2 days s/p surgery and calling to report she had bright red blood in the toilet after voiding 30 minutes ago. Pt states she cannot say if bleeding is from urine or vagina.  States she placed a panty liner after voiding and she has a couple of red dots

## 2018-03-08 ENCOUNTER — TELEPHONE (OUTPATIENT)
Dept: OBGYN CLINIC | Facility: CLINIC | Age: 33
End: 2018-03-08

## 2018-03-08 NOTE — TELEPHONE ENCOUNTER
Had a procedure done Sunday by rigo has developed a rash / hives by her forearms, pt would like to know if she should take her am dose of ibuprofen ?

## 2018-03-08 NOTE — TELEPHONE ENCOUNTER
Informed pt that LYNETTE stated if she need to take a medication she can take Tylenol for the pain. LYNETTE wanted pt to know that he sent a [employer] message for her pathology and it was benign. Pt has an appt for post surgery.

## 2018-03-08 NOTE — TELEPHONE ENCOUNTER
Pt had surgery on 3/3/18 with LYNETTE. Pt states that developed a rash/hives  from taking Ibuprofen. Pt took last Ibuprofen at 9:15 PM.  Denies sob or itching. Pt states it has gone today.   Pt states that Tramadol did not work for her at the hospital, so she

## 2018-03-08 NOTE — OPERATIVE REPORT
[facility]    PATIENT'S NAME: Salas Zaman   ATTENDING PHYSICIAN: Braxton Veliz DO   OPERATING PHYSICIAN: Pedro Pablo Veliz DO   PATIENT ACCOUNT#:   [de-identified]    LOCATION:  [address] [place] [address] #:   [id number]       DATE OF BIRTH: placed. Through this, the scope was placed under direct visualization. We then placed 5 mm and 10 mm accessory ports in the left and right lower quadrants, respectively. These were done under direct visualization.   We used Marcaine at these incisions as correct, and the patient was awakened from anesthesia and transferred to postanesthesia care unit in stable condition. Dictated By Adolph Veliz DO  d: 03/08/2018 10:25:47  t: 03/08/2018 10:58:54  [facility] [id number]  [facility]/    cc: Charly Solis

## 2018-03-16 ENCOUNTER — OFFICE VISIT (OUTPATIENT)
Dept: OBGYN CLINIC | Facility: CLINIC | Age: 33
End: 2018-03-16

## 2018-03-16 VITALS
BODY MASS INDEX: 21 KG/M2 | DIASTOLIC BLOOD PRESSURE: 69 MMHG | HEART RATE: 79 BPM | SYSTOLIC BLOOD PRESSURE: 106 MMHG | WEIGHT: 143 LBS

## 2018-03-16 DIAGNOSIS — Z98.890 POST-OPERATIVE STATE: Primary | ICD-10-CM

## 2018-03-16 PROCEDURE — 99024 POSTOP FOLLOW-UP VISIT: CPT | Performed by: OBSTETRICS & GYNECOLOGY

## 2018-04-09 NOTE — PROGRESS NOTES
POST OP EXAM: No complaints. No pain. Bleeding was scant. PE: Incisions healed well and abdomen soft and nontender    IMP: Normal Post op    PLAN: Preconception advice. Wait 3 cycles. Annual in 6 months.

## 2018-05-11 NOTE — H&P
[address] Patient Status:  Emergency    1985 MRN [id number]   Location [address] Attending Marleny Sanders MD   Hosp Day # 0 PCP Venu Woodward MD     Date of Medications:    (Not in a hospital admission)  History     Past Medical History:  Past Medical History:   Diagnosis Date   • Allergic rhinitis    • Menstrual problem     Menstrual problems: Medication BCP       Past Surgical History:  Past Surgical His CREATSERUM 0.73 02/22/2018   BUN 6 (L) 02/22/2018    02/22/2018   K 4.0 02/22/2018    02/22/2018   CO2 24 02/22/2018   GLU 90 02/22/2018   CA 9.5 02/22/2018   ALB 4.7 02/22/2018   ALKPHO 36 02/22/2018   BILT 1.0 02/22/2018   TP 7.7 02/22/2018 37.4

## 2018-06-29 ENCOUNTER — APPOINTMENT (OUTPATIENT)
Dept: LAB | Facility: HOSPITAL | Age: 33
End: 2018-06-29
Attending: OBSTETRICS & GYNECOLOGY
Payer: COMMERCIAL

## 2018-06-29 DIAGNOSIS — Z32.00 PREGNANCY EXAMINATION OR TEST, PREGNANCY UNCONFIRMED: ICD-10-CM

## 2018-06-29 DIAGNOSIS — Z87.59 HISTORY OF ECTOPIC PREGNANCY: ICD-10-CM

## 2018-06-29 LAB — B-HCG SERPL-ACNC: 187.1 MIU/ML

## 2018-06-29 PROCEDURE — 36415 COLL VENOUS BLD VENIPUNCTURE: CPT

## 2018-06-29 PROCEDURE — 84702 CHORIONIC GONADOTROPIN TEST: CPT

## 2018-07-01 ENCOUNTER — APPOINTMENT (OUTPATIENT)
Dept: LAB | Facility: HOSPITAL | Age: 33
End: 2018-07-01
Attending: OBSTETRICS & GYNECOLOGY
Payer: COMMERCIAL

## 2018-07-01 DIAGNOSIS — O03.4 INCOMPLETE SPONTANEOUS ABORTION: ICD-10-CM

## 2018-07-01 LAB — B-HCG SERPL-ACNC: 592.3 MIU/ML

## 2018-07-01 PROCEDURE — 84702 CHORIONIC GONADOTROPIN TEST: CPT

## 2018-07-01 PROCEDURE — 36415 COLL VENOUS BLD VENIPUNCTURE: CPT

## 2018-07-02 ENCOUNTER — TELEPHONE (OUTPATIENT)
Dept: OBGYN CLINIC | Facility: CLINIC | Age: 33
End: 2018-07-02

## 2018-07-02 DIAGNOSIS — Z87.59 HISTORY OF ECTOPIC PREGNANCY: Primary | ICD-10-CM

## 2018-07-02 NOTE — TELEPHONE ENCOUNTER
Informed pt per LYNETTE note 5/14/18 next step in plan of care is u/s done 6-7 weeks. Pt reports her lmp was 5/30/18 (4w5d). Pt aware to schedule u/s appt after 7/18/18. Phone number given to schedule u/s appt. Sent to LYNETTE for sign off.

## 2018-07-16 ENCOUNTER — HOSPITAL ENCOUNTER (OUTPATIENT)
Age: 33
Discharge: HOME | End: 2018-07-16
Attending: FAMILY MEDICINE
Payer: COMMERCIAL

## 2018-07-16 VITALS
TEMPERATURE: 98 F | BODY MASS INDEX: 20.73 KG/M2 | OXYGEN SATURATION: 100 % | DIASTOLIC BLOOD PRESSURE: 56 MMHG | HEART RATE: 78 BPM | WEIGHT: 140 LBS | SYSTOLIC BLOOD PRESSURE: 96 MMHG | HEIGHT: 69 IN | RESPIRATION RATE: 18 BRPM

## 2018-07-16 DIAGNOSIS — S16.1XXA STRAIN OF NECK MUSCLE, INITIAL ENCOUNTER: Primary | ICD-10-CM

## 2018-07-16 PROCEDURE — 99212 OFFICE O/P EST SF 10 MIN: CPT

## 2018-07-16 PROCEDURE — 99213 OFFICE O/P EST LOW 20 MIN: CPT

## 2018-07-16 NOTE — ED INITIAL ASSESSMENT (HPI)
Reports \"stiff neck\"  starting Tuesday of last week. States pain radiates to left shoulder, + decreased rom. 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      Patient reports being aprox 6-7 weeks pr

## 2018-07-16 NOTE — ED PROVIDER NOTES
Patient Seen in: [facility]    History   Patient presents with:  Neck Pain (musculoskeletal, neurologic)    Stated Complaint: NECK PAIN    HPI    Is here with the posterior left-sided neck pain for the past 2 weeks.   Pain Oropharynx is clear and moist. No oropharyngeal exudate. Eyes: Conjunctivae are normal. Pupils are equal, round, and reactive to light. No scleral icterus. Neck: Neck supple. No JVD present. No tracheal deviation present.    There is decreased range of

## 2018-07-20 ENCOUNTER — HOSPITAL ENCOUNTER (OUTPATIENT)
Dept: ULTRASOUND IMAGING | Facility: HOSPITAL | Age: 33
Discharge: HOME | End: 2018-07-20
Attending: OBSTETRICS & GYNECOLOGY
Payer: COMMERCIAL

## 2018-07-20 ENCOUNTER — TELEPHONE (OUTPATIENT)
Dept: OBGYN CLINIC | Facility: CLINIC | Age: 33
End: 2018-07-20

## 2018-07-20 DIAGNOSIS — Z87.59 HISTORY OF ECTOPIC PREGNANCY: ICD-10-CM

## 2018-07-20 PROCEDURE — 76817 TRANSVAGINAL US OBSTETRIC: CPT | Performed by: OBSTETRICS & GYNECOLOGY

## 2018-07-20 PROCEDURE — 76801 OB US < 14 WKS SINGLE FETUS: CPT | Performed by: OBSTETRICS & GYNECOLOGY

## 2018-07-25 ENCOUNTER — TELEPHONE (OUTPATIENT)
Dept: OBGYN CLINIC | Facility: CLINIC | Age: 33
End: 2018-07-25

## 2018-07-25 NOTE — TELEPHONE ENCOUNTER
----- Message from Zonia Root DO sent at 7/21/2018  2:28 PM CDT -----  Loni Lefort. We just ended call. The ultrasound matches within 2 days of period dates. We won't change date. I'll ask our Nurses to arrange a new prenatal nurse visit.  I'd like yo

## 2018-08-02 ENCOUNTER — LAB ENCOUNTER (OUTPATIENT)
Dept: LAB | Facility: HOSPITAL | Age: 33
End: 2018-08-02
Attending: OBSTETRICS & GYNECOLOGY
Payer: COMMERCIAL

## 2018-08-02 ENCOUNTER — NURSE ONLY (OUTPATIENT)
Dept: OBGYN CLINIC | Facility: CLINIC | Age: 33
End: 2018-08-02

## 2018-08-02 VITALS — BODY MASS INDEX: 22.12 KG/M2 | WEIGHT: 152.81 LBS | HEIGHT: 69.5 IN

## 2018-08-02 DIAGNOSIS — Z34.81 ENCOUNTER FOR SUPERVISION OF OTHER NORMAL PREGNANCY IN FIRST TRIMESTER: Primary | ICD-10-CM

## 2018-08-02 DIAGNOSIS — Z34.81 ENCOUNTER FOR SUPERVISION OF OTHER NORMAL PREGNANCY IN FIRST TRIMESTER: ICD-10-CM

## 2018-08-02 LAB
ANTIBODY SCREEN: NEGATIVE
BASOPHILS # BLD: 0 K/UL (ref 0–0.2)
BASOPHILS NFR BLD: 0 %
EOSINOPHIL # BLD: 0.1 K/UL (ref 0–0.7)
EOSINOPHIL NFR BLD: 1 %
ERYTHROCYTE [DISTWIDTH] IN BLOOD BY AUTOMATED COUNT: 13.9 % (ref 11–15)
HCT VFR BLD AUTO: 37.4 % (ref 35–48)
HGB BLD-MCNC: 12.7 G/DL (ref 12–16)
LYMPHOCYTES # BLD: 1.5 K/UL (ref 1–4)
LYMPHOCYTES NFR BLD: 17 %
MCH RBC QN AUTO: 30 PG (ref 27–32)
MCHC RBC AUTO-ENTMCNC: 33.9 G/DL (ref 32–37)
MCV RBC AUTO: 88.5 FL (ref 80–100)
MONOCYTES # BLD: 0.7 K/UL (ref 0–1)
MONOCYTES NFR BLD: 7 %
NEUTROPHILS # BLD AUTO: 6.8 K/UL (ref 1.8–7.7)
NEUTROPHILS NFR BLD: 75 %
PLATELET # BLD AUTO: 169 K/UL (ref 140–400)
PMV BLD AUTO: 11.1 FL (ref 7.4–10.3)
RBC # BLD AUTO: 4.23 M/UL (ref 3.7–5.4)
RH BLOOD TYPE: POSITIVE
RUBV IGG SER-ACNC: 10.9 IU/ML
WBC # BLD AUTO: 9.1 K/UL (ref 4–11)

## 2018-08-02 PROCEDURE — 86780 TREPONEMA PALLIDUM: CPT

## 2018-08-02 PROCEDURE — 86900 BLOOD TYPING SEROLOGIC ABO: CPT

## 2018-08-02 PROCEDURE — 87389 HIV-1 AG W/HIV-1&-2 AB AG IA: CPT

## 2018-08-02 PROCEDURE — 36415 COLL VENOUS BLD VENIPUNCTURE: CPT

## 2018-08-02 PROCEDURE — 86901 BLOOD TYPING SEROLOGIC RH(D): CPT

## 2018-08-02 PROCEDURE — 85025 COMPLETE CBC W/AUTO DIFF WBC: CPT

## 2018-08-02 PROCEDURE — 87086 URINE CULTURE/COLONY COUNT: CPT

## 2018-08-02 PROCEDURE — 87340 HEPATITIS B SURFACE AG IA: CPT

## 2018-08-02 PROCEDURE — 86850 RBC ANTIBODY SCREEN: CPT

## 2018-08-02 PROCEDURE — 86762 RUBELLA ANTIBODY: CPT

## 2018-08-02 NOTE — PROGRESS NOTES
Pt seen for OBN appt today with no complaints. Normal PN labs ordered. Pt advised all labs must be completed and resulted prior to MD appt.   Assisted pt with scheduling NPN appt with MD.    Partner's name is Dk España  contact #[phone]; race: Caucas inherited genetic or chromosomal disorders No    Patient or baby's father had a child with birth defects not listed above No    Previous miscarriages or stillborn No

## 2018-08-03 LAB
HBV SURFACE AG SERPL QL IA: NONREACTIVE
HIV1+2 AB SERPL QL IA: NONREACTIVE
T PALLIDUM AB SER QL: NEGATIVE

## 2018-08-07 ENCOUNTER — TELEPHONE (OUTPATIENT)
Dept: OBGYN CLINIC | Facility: CLINIC | Age: 33
End: 2018-08-07

## 2018-08-08 ENCOUNTER — TELEPHONE (OUTPATIENT)
Dept: OBGYN CLINIC | Facility: CLINIC | Age: 33
End: 2018-08-08

## 2018-08-08 ENCOUNTER — INITIAL PRENATAL (OUTPATIENT)
Dept: OBGYN CLINIC | Facility: CLINIC | Age: 33
End: 2018-08-08

## 2018-08-08 VITALS
SYSTOLIC BLOOD PRESSURE: 103 MMHG | WEIGHT: 152 LBS | HEART RATE: 66 BPM | DIASTOLIC BLOOD PRESSURE: 65 MMHG | BODY MASS INDEX: 22 KG/M2

## 2018-08-08 DIAGNOSIS — Z12.4 CERVICAL CANCER SCREENING: ICD-10-CM

## 2018-08-08 DIAGNOSIS — Z11.3 SCREEN FOR STD (SEXUALLY TRANSMITTED DISEASE): ICD-10-CM

## 2018-08-08 DIAGNOSIS — Z34.81 ENCOUNTER FOR SUPERVISION OF OTHER NORMAL PREGNANCY IN FIRST TRIMESTER: Primary | ICD-10-CM

## 2018-08-08 PROBLEM — O09.899 RUBELLA NON-IMMUNE STATUS, ANTEPARTUM: Status: ACTIVE | Noted: 2018-08-08

## 2018-08-08 PROBLEM — Z28.3 RUBELLA NON-IMMUNE STATUS, ANTEPARTUM: Status: ACTIVE | Noted: 2018-08-08

## 2018-08-08 PROBLEM — Z98.891 HISTORY OF CESAREAN SECTION: Status: ACTIVE | Noted: 2018-08-08

## 2018-08-08 PROBLEM — Z28.39 RUBELLA NON-IMMUNE STATUS, ANTEPARTUM: Status: ACTIVE | Noted: 2018-08-08

## 2018-08-08 PROBLEM — O99.891 RUBELLA NON-IMMUNE STATUS, ANTEPARTUM: Status: ACTIVE | Noted: 2018-08-08

## 2018-08-08 LAB
APPEARANCE: CLEAR
MULTISTIX LOT#: NORMAL NUMERIC
PH, URINE: 5 (ref 4.5–8)
SPECIFIC GRAVITY: 1.01 (ref 1–1.03)
URINE-COLOR: YELLOW
UROBILINOGEN,SEMI-QN: 0.2 MG/DL (ref 0–1.9)

## 2018-08-08 PROCEDURE — 81002 URINALYSIS NONAUTO W/O SCOPE: CPT | Performed by: OBSTETRICS & GYNECOLOGY

## 2018-08-08 RX ORDER — DOXYLAMINE SUCCINATE AND PYRIDOXINE HYDROCHLORIDE, DELAYED RELEASE TABLETS 10 MG/10 MG 10; 10 MG/1; MG/1
4 TABLET, DELAYED RELEASE ORAL DAILY
Qty: 120 TABLET | Refills: 0 | Status: SHIPPED | OUTPATIENT
Start: 2018-08-08 | End: 2018-11-28

## 2018-08-08 NOTE — TELEPHONE ENCOUNTER
Submitted Diclegis rx to pt's local pharmacy. Called pt's pharmacy and they processed the rx and received info that pt's insurance does not cover med. Will be about $1000.00 out of pocket for one month. Called pt and notified of this.  Provided pt with opti

## 2018-08-08 NOTE — PROGRESS NOTES
Pap/gc/ct today. Declines FTS. Desires repeat CS. Feels very nauseous- reviewed small meals, hydration and will give regimen for OTC Diclegis.    RTC 4 wks

## 2018-08-09 LAB
C TRACH DNA SPEC QL NAA+PROBE: NEGATIVE
HPV I/H RISK 1 DNA SPEC QL NAA+PROBE: POSITIVE
N GONORRHOEA DNA SPEC QL NAA+PROBE: NEGATIVE

## 2018-08-10 LAB — T VAGINALIS RRNA SPEC QL NAA+PROBE: NEGATIVE

## 2018-08-12 LAB
HPV16 DNA CVX QL PROBE+SIG AMP: NEGATIVE
HPV18 DNA CVX QL PROBE+SIG AMP: NEGATIVE

## 2018-09-06 ENCOUNTER — ROUTINE PRENATAL (OUTPATIENT)
Dept: OBGYN CLINIC | Facility: CLINIC | Age: 33
End: 2018-09-06

## 2018-09-06 VITALS
HEART RATE: 83 BPM | WEIGHT: 154.19 LBS | DIASTOLIC BLOOD PRESSURE: 70 MMHG | SYSTOLIC BLOOD PRESSURE: 109 MMHG | BODY MASS INDEX: 22 KG/M2

## 2018-09-06 DIAGNOSIS — Z34.91 ENCOUNTER FOR SUPERVISION OF NORMAL PREGNANCY IN FIRST TRIMESTER, UNSPECIFIED GRAVIDITY: Primary | ICD-10-CM

## 2018-09-06 LAB
APPEARANCE: CLEAR
MULTISTIX LOT#: NORMAL NUMERIC
PH, URINE: 7 (ref 4.5–8)
SPECIFIC GRAVITY: 1.01 (ref 1–1.03)
URINE-COLOR: YELLOW
UROBILINOGEN,SEMI-QN: 0.2 MG/DL (ref 0–1.9)

## 2018-09-06 PROCEDURE — 81002 URINALYSIS NONAUTO W/O SCOPE: CPT | Performed by: OBSTETRICS & GYNECOLOGY

## 2018-10-02 ENCOUNTER — ROUTINE PRENATAL (OUTPATIENT)
Dept: OBGYN CLINIC | Facility: CLINIC | Age: 33
End: 2018-10-02

## 2018-10-02 VITALS
BODY MASS INDEX: 23 KG/M2 | DIASTOLIC BLOOD PRESSURE: 77 MMHG | WEIGHT: 161 LBS | SYSTOLIC BLOOD PRESSURE: 112 MMHG | HEART RATE: 76 BPM

## 2018-10-02 DIAGNOSIS — Z34.92 ENCOUNTER FOR SUPERVISION OF NORMAL PREGNANCY IN SECOND TRIMESTER, UNSPECIFIED GRAVIDITY: Primary | ICD-10-CM

## 2018-10-02 PROCEDURE — 90471 IMMUNIZATION ADMIN: CPT | Performed by: OBSTETRICS & GYNECOLOGY

## 2018-10-02 PROCEDURE — 81002 URINALYSIS NONAUTO W/O SCOPE: CPT | Performed by: OBSTETRICS & GYNECOLOGY

## 2018-10-02 PROCEDURE — 90686 IIV4 VACC NO PRSV 0.5 ML IM: CPT | Performed by: OBSTETRICS & GYNECOLOGY

## 2018-10-03 ENCOUNTER — TELEPHONE (OUTPATIENT)
Dept: OBGYN CLINIC | Facility: CLINIC | Age: 33
End: 2018-10-03

## 2018-10-03 NOTE — PROGRESS NOTES
Pt tolerated Flu vaccine well to left deltoid. Pt had no adverse reactions, VIS sheet given to pt and signed consent form sent to scanning.

## 2018-10-18 NOTE — TELEPHONE ENCOUNTER
Dr. Velez Child,    Please sign off on form:  -Highlight the patient and hit \"Chart\" button. -In Chart Review, w/in the Encounter tab - click 1 time on the Telephone call encounter for 10/3/18.  Scroll down the telephone encounter.  -Click \"scan on\" blue Hyper

## 2018-10-23 ENCOUNTER — HOSPITAL ENCOUNTER (OUTPATIENT)
Dept: ULTRASOUND IMAGING | Facility: HOSPITAL | Age: 33
Discharge: HOME | End: 2018-10-23
Attending: OBSTETRICS & GYNECOLOGY
Payer: COMMERCIAL

## 2018-10-23 DIAGNOSIS — Z34.92 ENCOUNTER FOR SUPERVISION OF NORMAL PREGNANCY IN SECOND TRIMESTER, UNSPECIFIED GRAVIDITY: ICD-10-CM

## 2018-10-23 PROCEDURE — 76805 OB US >/= 14 WKS SNGL FETUS: CPT | Performed by: OBSTETRICS & GYNECOLOGY

## 2018-10-30 ENCOUNTER — ROUTINE PRENATAL (OUTPATIENT)
Dept: OBGYN CLINIC | Facility: CLINIC | Age: 33
End: 2018-10-30

## 2018-10-30 VITALS
HEART RATE: 79 BPM | SYSTOLIC BLOOD PRESSURE: 99 MMHG | DIASTOLIC BLOOD PRESSURE: 62 MMHG | WEIGHT: 162 LBS | BODY MASS INDEX: 24 KG/M2

## 2018-10-30 DIAGNOSIS — Z34.92 ENCOUNTER FOR SUPERVISION OF NORMAL PREGNANCY IN SECOND TRIMESTER, UNSPECIFIED GRAVIDITY: Primary | ICD-10-CM

## 2018-10-30 PROBLEM — O44.40 LOW-LYING PLACENTA: Status: ACTIVE | Noted: 2018-10-30

## 2018-10-30 PROCEDURE — 81002 URINALYSIS NONAUTO W/O SCOPE: CPT | Performed by: OBSTETRICS & GYNECOLOGY

## 2018-11-01 PROBLEM — N83.201 BILATERAL OVARIAN CYSTS: Status: RESOLVED | Noted: 2018-03-03 | Resolved: 2018-11-01

## 2018-11-01 PROBLEM — N83.202 CYSTS OF BOTH OVARIES: Status: RESOLVED | Noted: 2018-03-04 | Resolved: 2018-11-01

## 2018-11-01 PROBLEM — O26.899 PELVIC PAIN AFFECTING PREGNANCY: Status: RESOLVED | Noted: 2018-03-03 | Resolved: 2018-11-01

## 2018-11-01 PROBLEM — N83.201 CYSTS OF BOTH OVARIES: Status: RESOLVED | Noted: 2018-03-04 | Resolved: 2018-11-01

## 2018-11-01 PROBLEM — R10.2 PELVIC PAIN AFFECTING PREGNANCY, ANTEPARTUM: Status: RESOLVED | Noted: 2018-03-04 | Resolved: 2018-11-01

## 2018-11-01 PROBLEM — Z87.59 HISTORY OF ECTOPIC PREGNANCY: Status: ACTIVE | Noted: 2018-11-01

## 2018-11-01 PROBLEM — O00.101 RIGHT TUBAL PREGNANCY WITHOUT INTRAUTERINE PREGNANCY: Status: RESOLVED | Noted: 2018-03-04 | Resolved: 2018-11-01

## 2018-11-01 PROBLEM — O26.899 PELVIC PAIN AFFECTING PREGNANCY, ANTEPARTUM: Status: RESOLVED | Noted: 2018-03-04 | Resolved: 2018-11-01

## 2018-11-01 PROBLEM — R10.2 PELVIC PAIN AFFECTING PREGNANCY: Status: RESOLVED | Noted: 2018-03-03 | Resolved: 2018-11-01

## 2018-11-01 PROBLEM — N83.202 BILATERAL OVARIAN CYSTS: Status: RESOLVED | Noted: 2018-03-03 | Resolved: 2018-11-01

## 2018-11-28 ENCOUNTER — LAB ENCOUNTER (OUTPATIENT)
Dept: LAB | Facility: HOSPITAL | Age: 33
End: 2018-11-28
Attending: OBSTETRICS & GYNECOLOGY
Payer: COMMERCIAL

## 2018-11-28 ENCOUNTER — ROUTINE PRENATAL (OUTPATIENT)
Dept: OBGYN CLINIC | Facility: CLINIC | Age: 33
End: 2018-11-28

## 2018-11-28 VITALS
WEIGHT: 172.19 LBS | SYSTOLIC BLOOD PRESSURE: 99 MMHG | DIASTOLIC BLOOD PRESSURE: 62 MMHG | HEART RATE: 78 BPM | BODY MASS INDEX: 25 KG/M2

## 2018-11-28 DIAGNOSIS — Z34.92 ENCOUNTER FOR SUPERVISION OF NORMAL PREGNANCY IN SECOND TRIMESTER, UNSPECIFIED GRAVIDITY: Primary | ICD-10-CM

## 2018-11-28 DIAGNOSIS — Z34.92 ENCOUNTER FOR SUPERVISION OF NORMAL PREGNANCY IN SECOND TRIMESTER, UNSPECIFIED GRAVIDITY: ICD-10-CM

## 2018-11-28 PROCEDURE — 82950 GLUCOSE TEST: CPT

## 2018-11-28 PROCEDURE — 81002 URINALYSIS NONAUTO W/O SCOPE: CPT | Performed by: OBSTETRICS & GYNECOLOGY

## 2018-11-28 PROCEDURE — 36415 COLL VENOUS BLD VENIPUNCTURE: CPT

## 2018-11-28 PROCEDURE — 85027 COMPLETE CBC AUTOMATED: CPT

## 2018-11-28 NOTE — PROGRESS NOTES
\"Liudmila\" Pelvic pain resolved but now has sciatic pain- we discussed heating pads and tylenol and possible physical therapy

## 2018-12-06 ENCOUNTER — TELEPHONE (OUTPATIENT)
Dept: OBGYN CLINIC | Facility: CLINIC | Age: 33
End: 2018-12-06

## 2018-12-06 DIAGNOSIS — R73.09 ELEVATED GLUCOSE TOLERANCE TEST: Primary | ICD-10-CM

## 2018-12-06 NOTE — TELEPHONE ENCOUNTER
Pt advised of results and recs. Advised to fast for 12 hrs only water. CS # given. Advised if not able to be seen my beginning of next week to call us. [id number] Loly Shrestha with plan and states understanding. Order placed.

## 2018-12-19 ENCOUNTER — ROUTINE PRENATAL (OUTPATIENT)
Dept: OBGYN CLINIC | Facility: CLINIC | Age: 33
End: 2018-12-19

## 2018-12-19 VITALS
HEART RATE: 76 BPM | DIASTOLIC BLOOD PRESSURE: 63 MMHG | WEIGHT: 171 LBS | BODY MASS INDEX: 25 KG/M2 | SYSTOLIC BLOOD PRESSURE: 108 MMHG

## 2018-12-19 DIAGNOSIS — Z34.93 ENCOUNTER FOR SUPERVISION OF NORMAL PREGNANCY IN THIRD TRIMESTER, UNSPECIFIED GRAVIDITY: Primary | ICD-10-CM

## 2018-12-19 PROCEDURE — 81002 URINALYSIS NONAUTO W/O SCOPE: CPT | Performed by: OBSTETRICS & GYNECOLOGY

## 2018-12-24 ENCOUNTER — TELEPHONE (OUTPATIENT)
Dept: OBGYN CLINIC | Facility: CLINIC | Age: 33
End: 2018-12-24

## 2018-12-24 ENCOUNTER — LABORATORY ENCOUNTER (OUTPATIENT)
Dept: LAB | Facility: HOSPITAL | Age: 33
End: 2018-12-24
Attending: OBSTETRICS & GYNECOLOGY
Payer: COMMERCIAL

## 2018-12-24 DIAGNOSIS — R73.09 ELEVATED GLUCOSE TOLERANCE TEST: ICD-10-CM

## 2018-12-24 PROCEDURE — 82952 GTT-ADDED SAMPLES: CPT

## 2018-12-24 PROCEDURE — 36415 COLL VENOUS BLD VENIPUNCTURE: CPT

## 2018-12-24 PROCEDURE — 82951 GLUCOSE TOLERANCE TEST (GTT): CPT

## 2018-12-24 NOTE — TELEPHONE ENCOUNTER
LMTCB FOR PT.   EVERETTE CALLED AND PT'S 3 HOUR VALUE WAS 56. WOULD LIKE TO FIND OUT HOW PT IS FEELING?

## 2018-12-26 ENCOUNTER — TELEPHONE (OUTPATIENT)
Dept: OBGYN CLINIC | Facility: CLINIC | Age: 33
End: 2018-12-26

## 2018-12-26 NOTE — TELEPHONE ENCOUNTER
Pt was advised she passed the 3 Hr GTT but her 3 hr value was low at 56. Pt states she was feeling fine after the testing but she was very hungry. Pt states she is feeling fine today.

## 2018-12-26 NOTE — TELEPHONE ENCOUNTER
Patient states she wants to schedule  and prefers to have the procedure with LYNETTE.  Patient states she discussed tentantively with LYNETTE.

## 2019-01-02 ENCOUNTER — TELEPHONE (OUTPATIENT)
Dept: OBGYN CLINIC | Facility: CLINIC | Age: 34
End: 2019-01-02

## 2019-01-02 ENCOUNTER — ROUTINE PRENATAL (OUTPATIENT)
Dept: OBGYN CLINIC | Facility: CLINIC | Age: 34
End: 2019-01-02

## 2019-01-02 VITALS
HEART RATE: 80 BPM | DIASTOLIC BLOOD PRESSURE: 65 MMHG | BODY MASS INDEX: 26 KG/M2 | SYSTOLIC BLOOD PRESSURE: 106 MMHG | WEIGHT: 177.63 LBS

## 2019-01-02 DIAGNOSIS — Z01.818 PRE-OP TESTING: Primary | ICD-10-CM

## 2019-01-02 DIAGNOSIS — Z34.93 ENCOUNTER FOR SUPERVISION OF NORMAL PREGNANCY IN THIRD TRIMESTER, UNSPECIFIED GRAVIDITY: Primary | ICD-10-CM

## 2019-01-02 LAB
APPEARANCE: CLEAR
MULTISTIX LOT#: NORMAL NUMERIC
PH, URINE: 7.5 (ref 4.5–8)
SPECIFIC GRAVITY: 1.01 (ref 1–1.03)
URINE-COLOR: YELLOW
UROBILINOGEN,SEMI-QN: 0.2 MG/DL (ref 0–1.9)

## 2019-01-02 PROCEDURE — 81002 URINALYSIS NONAUTO W/O SCOPE: CPT | Performed by: OBSTETRICS & GYNECOLOGY

## 2019-01-02 NOTE — TELEPHONE ENCOUNTER
OB GYN SURGICAL SCHEDULING    Assessment: History of     Pre-Operative Procedure:  Repeat     Date:  3/1/19    Admission:  AM Admit    Anesthesia: Spinal    Additional Orders:  Routine Orders    Comments / Orders to Nurse:     Discussed possible complications including but not limited to:  bleeding, infection and injury, bowel / bladder

## 2019-01-03 NOTE — TELEPHONE ENCOUNTER
Patient is scheduled 3/1/19 9:30am rpcx JLK/Rrw requested. Pat orders. Instructions routed via my chart. Patient was informed.

## 2019-01-11 ENCOUNTER — TELEPHONE (OUTPATIENT)
Dept: OBGYN CLINIC | Facility: CLINIC | Age: 34
End: 2019-01-11

## 2019-01-11 DIAGNOSIS — O99.891 BACK PAIN AFFECTING PREGNANCY IN THIRD TRIMESTER: Primary | ICD-10-CM

## 2019-01-11 DIAGNOSIS — M54.9 BACK PAIN AFFECTING PREGNANCY IN THIRD TRIMESTER: Primary | ICD-10-CM

## 2019-01-11 NOTE — TELEPHONE ENCOUNTER
Pt is 7 mnth prg and states she has terrible back pain   Wants to know if she can get a referral for therapy   Doesn't want to take medication     Okay to porsche porras

## 2019-01-11 NOTE — TELEPHONE ENCOUNTER
Pt is 32w0d and reports having constant lower back pain 7/10 today. States she woke up last night with increased pain. States she does not want to take pain meds. Pt has tried the car seat heater with no improvement. Pt states she spoke to [facility] about this and pt requesting order for physical therapy (see 11/28 [facility] notes). 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 Advised pt to avoid prolonged standing or sitting. Also advised on wearing supportive shoes like gym shoes or walking shoes. Pt can also try a belly band or PN massage. Pt can also try stretches. Informed pt message will be forwarded to [facility] to ask if pt can have PT order.

## 2019-01-17 ENCOUNTER — APPOINTMENT (OUTPATIENT)
Dept: LAB | Facility: HOSPITAL | Age: 34
End: 2019-01-17
Attending: OBSTETRICS & GYNECOLOGY
Payer: COMMERCIAL

## 2019-01-17 ENCOUNTER — ROUTINE PRENATAL (OUTPATIENT)
Dept: OBGYN CLINIC | Facility: CLINIC | Age: 34
End: 2019-01-17

## 2019-01-17 ENCOUNTER — TELEPHONE (OUTPATIENT)
Dept: OBGYN CLINIC | Facility: CLINIC | Age: 34
End: 2019-01-17

## 2019-01-17 ENCOUNTER — OFFICE VISIT (OUTPATIENT)
Dept: PHYSICAL THERAPY | Age: 34
End: 2019-01-17
Attending: INTERNAL MEDICINE
Payer: COMMERCIAL

## 2019-01-17 VITALS
WEIGHT: 182 LBS | SYSTOLIC BLOOD PRESSURE: 113 MMHG | DIASTOLIC BLOOD PRESSURE: 68 MMHG | HEART RATE: 83 BPM | BODY MASS INDEX: 26 KG/M2

## 2019-01-17 DIAGNOSIS — M54.9 BACK PAIN AFFECTING PREGNANCY IN THIRD TRIMESTER: ICD-10-CM

## 2019-01-17 DIAGNOSIS — O99.891 BACK PAIN AFFECTING PREGNANCY IN THIRD TRIMESTER: ICD-10-CM

## 2019-01-17 DIAGNOSIS — Z34.93 ENCOUNTER FOR SUPERVISION OF NORMAL PREGNANCY IN THIRD TRIMESTER, UNSPECIFIED GRAVIDITY: Primary | ICD-10-CM

## 2019-01-17 LAB
BILIRUB UR QL: NEGATIVE
COLOR UR: YELLOW
GLUCOSE UR-MCNC: NEGATIVE MG/DL
HGB UR QL STRIP.AUTO: NEGATIVE
KETONES UR-MCNC: NEGATIVE MG/DL
MULTISTIX LOT#: NORMAL NUMERIC
NITRITE UR QL STRIP.AUTO: NEGATIVE
PH UR: 7 [PH] (ref 5–8)
PH, URINE: 7 (ref 4.5–8)
PROT UR-MCNC: NEGATIVE MG/DL
RBC #/AREA URNS AUTO: 1 /HPF
SP GR UR STRIP: 1.01 (ref 1–1.03)
SPECIFIC GRAVITY: 1.01 (ref 1–1.03)
UROBILINOGEN UR STRIP-ACNC: <2
UROBILINOGEN,SEMI-QN: 0.2 MG/DL (ref 0–1.9)
VIT C UR-MCNC: NEGATIVE MG/DL
WBC #/AREA URNS AUTO: 2 /HPF

## 2019-01-17 PROCEDURE — 81001 URINALYSIS AUTO W/SCOPE: CPT

## 2019-01-17 PROCEDURE — 81002 URINALYSIS NONAUTO W/O SCOPE: CPT | Performed by: OBSTETRICS & GYNECOLOGY

## 2019-01-17 PROCEDURE — 97162 PT EVAL MOD COMPLEX 30 MIN: CPT

## 2019-01-17 PROCEDURE — 97110 THERAPEUTIC EXERCISES: CPT

## 2019-01-17 NOTE — PROGRESS NOTES
Crying due to back pain & wanting to stop work -- started few weeks ago one side but now both sides -- saw PT today but only for discussion. Declines any usage of tylenol or other meds -- states she does not want to harm baby.  Informed pt tylenol is safe i

## 2019-01-17 NOTE — TELEPHONE ENCOUNTER
Declined -- I already told pt to try tylenol & also consider flexeril which is a category B medicine. I already gave her a note stating she may have the next 2 days off as a compromise. I WILL NOT GIVE FMLA. She is not agreeing to try safe meds.  SHE DOES N

## 2019-01-17 NOTE — PROGRESS NOTES
PELVIC FLOOR EVALUATION:   Referring Physician: Dr. Love Mendez  Diagnosis: Back pain affecting pregnancy in third trimester (O99.89,M54.9)  Date of Onset:  2 months prior    Date of Service: 1/17/2019     PATIENT SUMMARY   Singh Alaniz is a 34 year o pain levels (VAS 8/10) and is unable to sleep. She reports onset a few months prior and has been worsening. She has a 1 y/o child. Pain is located in the L lower lumbar spine.   Functional limitations include lifting child, standing, walking, bending, lyi daughter)  Complications in pregnancies: groin pain only, breech  Delivery Method:   Complications in deliveries: none  Episiotomy/tearing:NA  Length of delivery/pushing:NA    URINARY HABITS  Types of symptoms: none    BOWEL HABITS  Types of sympt normatives  3. Pregnancy changes  4. Seated posture-pregnancy  5. Standing posture: pregnancy  6. Quadriped rocking  7.  SB seated exercises    Charges: PT Lucina x1, 1TE   Total Time: 45 min  Total Timed treatment: 15 min    Thank you for your referral. Thi

## 2019-01-17 NOTE — TELEPHONE ENCOUNTER
I was asked by the  staff to speak with pt since she was very upset with her visit today. Pt brought back to my office crying and upset. Pt. States that she asked [employer] if she could fill out paperwork for pt to stop work due to back pain.  She is a

## 2019-01-21 ENCOUNTER — OFFICE VISIT (OUTPATIENT)
Dept: PHYSICAL THERAPY | Age: 34
End: 2019-01-21
Attending: OBSTETRICS & GYNECOLOGY
Payer: COMMERCIAL

## 2019-01-21 PROCEDURE — 97140 MANUAL THERAPY 1/> REGIONS: CPT

## 2019-01-21 PROCEDURE — 97530 THERAPEUTIC ACTIVITIES: CPT

## 2019-01-21 PROCEDURE — 97110 THERAPEUTIC EXERCISES: CPT

## 2019-01-21 NOTE — PROGRESS NOTES
Dx:      Back pain affecting pregnancy in third trimester (O99.89,M54.9)   Authorized # of Visits:  8 (HMO; Cert ends - 3/80/3529)        Next MD visit: none scheduled  Fall Risk: standard         Precautions: n/a           Medication Changes since last vi spine with centralized symptoms following manual therapy. She required consistent cueing to engaged TrA and normalized breathing sequence with gait and exercises.  She reported no change in pain intensity post-tx; however, she reported mild relief with acco

## 2019-01-23 ENCOUNTER — NURSE TRIAGE (OUTPATIENT)
Dept: OTHER | Age: 34
End: 2019-01-23

## 2019-01-23 ENCOUNTER — APPOINTMENT (OUTPATIENT)
Dept: PHYSICAL THERAPY | Age: 34
End: 2019-01-23
Attending: OBSTETRICS & GYNECOLOGY
Payer: COMMERCIAL

## 2019-01-23 ENCOUNTER — TELEPHONE (OUTPATIENT)
Dept: OBGYN CLINIC | Facility: CLINIC | Age: 34
End: 2019-01-23

## 2019-01-23 NOTE — TELEPHONE ENCOUNTER
Action Requested: Summary for Provider     []  Critical Lab, Recommendations Needed  [] Need Additional Advice  []   FYI    []   Need Orders  [] Need Medications Sent to Pharmacy  []  Other     SUMMARY:Pt requesting Appt with you.  Pt called and stated 36

## 2019-01-23 NOTE — TELEPHONE ENCOUNTER
33w5d. Pt states that she has yellow nasal discharge. Denies a fever. Pt states that she has sneezing, headache, runny nose and a sore throat. Informed pt to see her pcp. Pt stated understanding.       Informed pt to push fluids and also rest. DISCHARGE PLANNING     Discharge to home or other facility with appropriate resources Progressing        INFECTION - ADULT     Absence or prevention of progression during hospitalization Progressing     Absence of fever/infection during neutropenic period Progressing        Knowledge Deficit     Patient/family/caregiver demonstrates understanding of disease process, treatment plan, medications, and discharge instructions Progressing        PAIN - ADULT     Verbalizes/displays adequate comfort level or baseline comfort level Progressing        Potential for Falls     Patient will remain free of falls Progressing        Prexisting or High Potential for Compromised Skin Integrity     Skin integrity is maintained or improved Progressing        SAFETY ADULT     Maintain or return to baseline ADL function Progressing     Maintain or return mobility status to optimal level Progressing

## 2019-01-23 NOTE — TELEPHONE ENCOUNTER
Pt is aware of the below. Pt states that she wants to know if she can have FMLA for intermittent days off for work. She states that she has PT and sees the MD.  She is afraid that she is going to get fired for missing work.       Sent to [place] to talk to

## 2019-01-24 ENCOUNTER — ROUTINE PRENATAL (OUTPATIENT)
Dept: OBGYN CLINIC | Facility: CLINIC | Age: 34
End: 2019-01-24

## 2019-01-24 ENCOUNTER — OFFICE VISIT (OUTPATIENT)
Dept: INTERNAL MEDICINE CLINIC | Facility: CLINIC | Age: 34
End: 2019-01-24

## 2019-01-24 ENCOUNTER — OFFICE VISIT (OUTPATIENT)
Dept: PHYSICAL THERAPY | Age: 34
End: 2019-01-24
Attending: OBSTETRICS & GYNECOLOGY
Payer: COMMERCIAL

## 2019-01-24 ENCOUNTER — LAB ENCOUNTER (OUTPATIENT)
Dept: LAB | Facility: HOSPITAL | Age: 34
End: 2019-01-24
Attending: INTERNAL MEDICINE
Payer: COMMERCIAL

## 2019-01-24 VITALS
SYSTOLIC BLOOD PRESSURE: 112 MMHG | HEART RATE: 89 BPM | DIASTOLIC BLOOD PRESSURE: 67 MMHG | WEIGHT: 178.5 LBS | HEIGHT: 69.5 IN | BODY MASS INDEX: 25.85 KG/M2 | RESPIRATION RATE: 20 BRPM | TEMPERATURE: 97 F

## 2019-01-24 VITALS
BODY MASS INDEX: 26 KG/M2 | WEIGHT: 177 LBS | SYSTOLIC BLOOD PRESSURE: 120 MMHG | HEART RATE: 84 BPM | DIASTOLIC BLOOD PRESSURE: 76 MMHG

## 2019-01-24 DIAGNOSIS — Z00.00 ROUTINE PHYSICAL EXAMINATION: Primary | ICD-10-CM

## 2019-01-24 DIAGNOSIS — Z00.00 ROUTINE PHYSICAL EXAMINATION: ICD-10-CM

## 2019-01-24 DIAGNOSIS — Z34.93 ENCOUNTER FOR SUPERVISION OF NORMAL PREGNANCY IN THIRD TRIMESTER, UNSPECIFIED GRAVIDITY: ICD-10-CM

## 2019-01-24 DIAGNOSIS — M54.9 BACK PAIN AFFECTING PREGNANCY IN THIRD TRIMESTER: Primary | ICD-10-CM

## 2019-01-24 DIAGNOSIS — J01.10 ACUTE NON-RECURRENT FRONTAL SINUSITIS: ICD-10-CM

## 2019-01-24 DIAGNOSIS — O99.891 BACK PAIN AFFECTING PREGNANCY IN THIRD TRIMESTER: Primary | ICD-10-CM

## 2019-01-24 PROBLEM — J02.9 PHARYNGITIS: Status: ACTIVE | Noted: 2019-01-24

## 2019-01-24 LAB
ALBUMIN SERPL BCP-MCNC: 3 G/DL (ref 3.5–4.8)
ALBUMIN/GLOB SERPL: 0.9 {RATIO} (ref 1–2)
ALP SERPL-CCNC: 69 U/L (ref 32–100)
ALT SERPL-CCNC: 11 U/L (ref 14–54)
ANION GAP SERPL CALC-SCNC: 8 MMOL/L (ref 0–18)
AST SERPL-CCNC: 17 U/L (ref 15–41)
BASOPHILS # BLD: 0 K/UL (ref 0–0.2)
BASOPHILS NFR BLD: 0 %
BILIRUB SERPL-MCNC: 0.2 MG/DL (ref 0.3–1.2)
BUN SERPL-MCNC: 5 MG/DL (ref 8–20)
BUN/CREAT SERPL: 9.8 (ref 10–20)
CALCIUM SERPL-MCNC: 8.8 MG/DL (ref 8.5–10.5)
CHLORIDE SERPL-SCNC: 104 MMOL/L (ref 95–110)
CO2 SERPL-SCNC: 22 MMOL/L (ref 22–32)
CONTROL LINE PRESENT WITH A CLEAR BACKGROUND (YES/NO): YES YES/NO
CREAT SERPL-MCNC: 0.51 MG/DL (ref 0.5–1.5)
EOSINOPHIL # BLD: 0.1 K/UL (ref 0–0.7)
EOSINOPHIL NFR BLD: 1 %
ERYTHROCYTE [DISTWIDTH] IN BLOOD BY AUTOMATED COUNT: 12.9 % (ref 11–15)
GLOBULIN PLAS-MCNC: 3.4 G/DL (ref 2.5–3.7)
GLUCOSE SERPL-MCNC: 110 MG/DL (ref 70–99)
HCT VFR BLD AUTO: 33 % (ref 35–48)
HGB BLD-MCNC: 11.3 G/DL (ref 12–16)
KIT LOT #: NORMAL NUMERIC
LYMPHOCYTES # BLD: 0.9 K/UL (ref 1–4)
LYMPHOCYTES NFR BLD: 12 %
MCH RBC QN AUTO: 31.8 PG (ref 27–32)
MCHC RBC AUTO-ENTMCNC: 34.2 G/DL (ref 32–37)
MCV RBC AUTO: 92.8 FL (ref 80–100)
METAMYELOCYTES # BLD MANUAL: 0.08 K/UL
METAMYELOCYTES NFR BLD: 1 %
MONOCYTES # BLD: 1 K/UL (ref 0–1)
MONOCYTES NFR BLD: 13 %
MULTISTIX LOT#: NORMAL NUMERIC
NEUTROPHILS # BLD AUTO: 5.6 K/UL (ref 1.8–7.7)
NEUTROPHILS NFR BLD: 69 %
NEUTS BAND NFR BLD: 4 %
OSMOLALITY UR CALC.SUM OF ELEC: 276 MOSM/KG (ref 275–295)
PATIENT FASTING: NO
PH, URINE: 7 (ref 4.5–8)
PLATELET # BLD AUTO: 149 K/UL (ref 140–400)
PMV BLD AUTO: 10.1 FL (ref 7.4–10.3)
POTASSIUM SERPL-SCNC: 3.9 MMOL/L (ref 3.3–5.1)
PROT SERPL-MCNC: 6.4 G/DL (ref 5.9–8.4)
RBC # BLD AUTO: 3.56 M/UL (ref 3.7–5.4)
SODIUM SERPL-SCNC: 134 MMOL/L (ref 136–144)
SPECIFIC GRAVITY: 1.01 (ref 1–1.03)
STREP GRP A CUL-SCR: NEGATIVE
TSH SERPL-ACNC: 2.01 UIU/ML (ref 0.45–5.33)
VIT B12 SERPL-MCNC: 201 PG/ML (ref 181–914)
WBC # BLD AUTO: 7.7 K/UL (ref 4–11)

## 2019-01-24 PROCEDURE — 85025 COMPLETE CBC W/AUTO DIFF WBC: CPT

## 2019-01-24 PROCEDURE — 80053 COMPREHEN METABOLIC PANEL: CPT

## 2019-01-24 PROCEDURE — 85007 BL SMEAR W/DIFF WBC COUNT: CPT

## 2019-01-24 PROCEDURE — 81002 URINALYSIS NONAUTO W/O SCOPE: CPT | Performed by: OBSTETRICS & GYNECOLOGY

## 2019-01-24 PROCEDURE — 36415 COLL VENOUS BLD VENIPUNCTURE: CPT

## 2019-01-24 PROCEDURE — 84443 ASSAY THYROID STIM HORMONE: CPT

## 2019-01-24 PROCEDURE — 82306 VITAMIN D 25 HYDROXY: CPT

## 2019-01-24 PROCEDURE — 99212 OFFICE O/P EST SF 10 MIN: CPT | Performed by: INTERNAL MEDICINE

## 2019-01-24 PROCEDURE — 87880 STREP A ASSAY W/OPTIC: CPT | Performed by: INTERNAL MEDICINE

## 2019-01-24 PROCEDURE — 97530 THERAPEUTIC ACTIVITIES: CPT

## 2019-01-24 PROCEDURE — 97140 MANUAL THERAPY 1/> REGIONS: CPT

## 2019-01-24 PROCEDURE — 99213 OFFICE O/P EST LOW 20 MIN: CPT | Performed by: INTERNAL MEDICINE

## 2019-01-24 PROCEDURE — 99395 PREV VISIT EST AGE 18-39: CPT | Performed by: INTERNAL MEDICINE

## 2019-01-24 PROCEDURE — 82607 VITAMIN B-12: CPT

## 2019-01-24 PROCEDURE — 99213 OFFICE O/P EST LOW 20 MIN: CPT | Performed by: OBSTETRICS & GYNECOLOGY

## 2019-01-24 PROCEDURE — 85027 COMPLETE CBC AUTOMATED: CPT

## 2019-01-24 PROCEDURE — 97110 THERAPEUTIC EXERCISES: CPT

## 2019-01-24 RX ORDER — AZITHROMYCIN 250 MG/1
TABLET, FILM COATED ORAL
Qty: 6 TABLET | Refills: 0 | Status: SHIPPED | OUTPATIENT
Start: 2019-01-24 | End: 2019-02-12

## 2019-01-24 RX ORDER — CYCLOBENZAPRINE HCL 10 MG
10 TABLET ORAL 3 TIMES DAILY PRN
Qty: 9 TABLET | Refills: 0 | Status: SHIPPED | OUTPATIENT
Start: 2019-01-24 | End: 2019-02-12

## 2019-01-24 NOTE — PROGRESS NOTES
Dx:      Back pain affecting pregnancy in third trimester (O99.89,M54.9)   Authorized # of Visits:  8 (HMO; Cert ends - 8/03/7801)        Next MD visit: none scheduled  Fall Risk: standard         Precautions: n/a           Medication Changes since last vi on patient education for postural alignment and lumbopelvic control to ease gait, transfers, and sitting.      Charges: TEx1, MT x1, Thera Act x1       Total Timed Treatment: 45 min  Total Treatment Time: 45 min

## 2019-01-24 NOTE — PATIENT INSTRUCTIONS
Problem List Items Addressed This Visit        Unprioritized    Acute non-recurrent frontal sinusitis     , Postnasal drip, sore throat especially on the right side.   She does have bilateral frontal sinus tenderness associated with nasal turbinate hypertro

## 2019-01-24 NOTE — ASSESSMENT & PLAN NOTE
Normal exam.  Labs as ordered. Skin check normal.  No significant abnormal nevi. No cervical or inguinal lymphadenopathy. Hernial orifices intact.   Breast exam, pelvic exam deferred–patient is currently pregnant is being followed up by gynecology on a r

## 2019-01-24 NOTE — ASSESSMENT & PLAN NOTE
, Postnasal drip, sore throat especially on the right side. She does have bilateral frontal sinus tenderness associated with nasal turbinate hypertrophy and almost complete obstruction of the right side.   Postnasal drip and erythematous pharynx noted on e

## 2019-01-24 NOTE — PROGRESS NOTES
HPI:    Patient ID: Shaina Whitmore is a 34year old female. Physical-per pt request    Concerned about endocrine disruptors and would like chemical testing for blood.   Discussed absence of a diagnosis requiring the testing which would mean possible la The current episode started in the past 7 days. The problem has been waxing and waning (Nasal congestion, nasal pressure, postnasal drip, sore throat and right ear discomfort. ). The pain is worse on the right side. There has been no fever.  The pain is at External ear normal.   Nose: Nose normal.   Mouth/Throat: Oropharynx is clear and moist. No oropharyngeal exudate. Eyes: Conjunctivae and EOM are normal. Pupils are equal, round, and reactive to light. Right eye exhibits no discharge.  Left eye exhibits n Relevant Orders    CBC WITH DIFFERENTIAL WITH PLATELET (Completed)    COMP METABOLIC PANEL (14) (Completed)    ASSAY, THYROID STIM HORMONE (Completed)    VITAMIN B12 (Completed)    VITAMIN D, 25-HYDROXY (Completed)    Acute non-recurrent frontal sinusitis

## 2019-01-25 LAB — 25(OH)D3 SERPL-MCNC: 18 NG/ML (ref 30–100)

## 2019-01-28 ENCOUNTER — HOSPITAL ENCOUNTER (OUTPATIENT)
Facility: HOSPITAL | Age: 34
Setting detail: OBSERVATION
Discharge: HOME HEALTH | End: 2019-01-28
Attending: OBSTETRICS & GYNECOLOGY | Admitting: OBSTETRICS & GYNECOLOGY
Payer: COMMERCIAL

## 2019-01-28 ENCOUNTER — TELEPHONE (OUTPATIENT)
Dept: OBGYN CLINIC | Facility: CLINIC | Age: 34
End: 2019-01-28

## 2019-01-28 ENCOUNTER — ROUTINE PRENATAL (OUTPATIENT)
Dept: OBGYN CLINIC | Facility: CLINIC | Age: 34
End: 2019-01-28

## 2019-01-28 ENCOUNTER — TELEPHONE (OUTPATIENT)
Dept: OTHER | Age: 34
End: 2019-01-28

## 2019-01-28 VITALS
DIASTOLIC BLOOD PRESSURE: 70 MMHG | HEART RATE: 90 BPM | SYSTOLIC BLOOD PRESSURE: 112 MMHG | BODY MASS INDEX: 26 KG/M2 | WEIGHT: 180 LBS

## 2019-01-28 VITALS — HEART RATE: 89 BPM | TEMPERATURE: 98 F | DIASTOLIC BLOOD PRESSURE: 61 MMHG | SYSTOLIC BLOOD PRESSURE: 106 MMHG

## 2019-01-28 DIAGNOSIS — Z34.93 ENCOUNTER FOR SUPERVISION OF NORMAL PREGNANCY IN THIRD TRIMESTER, UNSPECIFIED GRAVIDITY: Primary | ICD-10-CM

## 2019-01-28 PROBLEM — Z34.90 PREGNANCY: Status: ACTIVE | Noted: 2019-01-28

## 2019-01-28 LAB
BILIRUB UR QL: NEGATIVE
COLOR UR: YELLOW
GLUCOSE UR-MCNC: NEGATIVE MG/DL
HGB UR QL STRIP.AUTO: NEGATIVE
KETONES UR-MCNC: NEGATIVE MG/DL
MULTISTIX LOT#: NORMAL NUMERIC
NITRITE UR QL STRIP.AUTO: NEGATIVE
PH UR: 7 [PH] (ref 5–8)
PH, URINE: 7 (ref 4.5–8)
PROT UR-MCNC: NEGATIVE MG/DL
RBC #/AREA URNS AUTO: 3 /HPF
SP GR UR STRIP: 1.01 (ref 1–1.03)
SPECIFIC GRAVITY: 1.01 (ref 1–1.03)
UROBILINOGEN UR STRIP-ACNC: <2
VIT C UR-MCNC: NEGATIVE MG/DL
WBC #/AREA URNS AUTO: 12 /HPF

## 2019-01-28 PROCEDURE — 81002 URINALYSIS NONAUTO W/O SCOPE: CPT | Performed by: OBSTETRICS & GYNECOLOGY

## 2019-01-28 PROCEDURE — 59025 FETAL NON-STRESS TEST: CPT | Performed by: OBSTETRICS & GYNECOLOGY

## 2019-01-28 RX ORDER — SODIUM CHLORIDE 0.9 % (FLUSH) 0.9 %
2 SYRINGE (ML) INJECTION EVERY 8 HOURS
Status: DISCONTINUED | OUTPATIENT
Start: 2019-01-28 | End: 2019-01-28

## 2019-01-28 RX ORDER — SODIUM CHLORIDE 0.9 % (FLUSH) 0.9 %
2 SYRINGE (ML) INJECTION AS NEEDED
Status: DISCONTINUED | OUTPATIENT
Start: 2019-01-28 | End: 2019-01-28

## 2019-01-28 NOTE — TELEPHONE ENCOUNTER
Pt tried the muscle relaxer's and still had some pain . don't feel comfortable driving or going to work on them  Pt still having back pain , pt is tying everything you asked nothing is working .  Pt is 24 weeks

## 2019-01-28 NOTE — TELEPHONE ENCOUNTER
Spoke with patient--reports she is on her last day of ERNESTO trivedi, \"But I probably won't finish it because I am on the way to the hospital right now and probably won't be home tonight. My stomach its tight and I'm pregnant.  I don't want to start the medication

## 2019-01-28 NOTE — TELEPHONE ENCOUNTER
C/O IS DOING P.T. TWICE WEEKLY, YOGA AND STRETCHING AND IS STILL HAVING A FAIR AMOUNT OF PAIN. 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 STATES HER BACK WAS TOTALLY NUMB FOR THE FIRST PERIOD TODAY PROBABLY FROM SITTING SO LONG FOR THE DRIVE TO WORK. PT FEELS REALLY DROWSY WHEN TAKING MUSCLE RELAXERS, EVEN DURING THE DAY WHILE WORKING. PT WOULD LIKE TO BE OFF WORK. I EXPLAINED THAT WHATEVER TIME SHE TAKES OFF NOW WILL COME OFF HER TOTAL 12 WEEKS THAT LADIES USUALLY TAKE AFTER DELIVERY. SHE IS OK WITH THAT. SHE ALSO SAID LYNETTE MENTIONED THEY WOULD DISCUSS HER ON THIS PAST [address].   MESSAGE TO LYNETTE.

## 2019-01-28 NOTE — TELEPHONE ENCOUNTER
C/O LOW ABDOMEN IS HARD AND NOT GOING AWAY. STATES THIS IS WHAT HAPPENED IN PREVIOUS PREG AND WAS TOLD SHE HAD PTL BUT DELIVERED FULL TERM. PT ALSO AWARE I SPOKE WITH LYNETTE IN THE OFFICE AND HE HAS APPROVED HER TIME OFF WORK AND PAPERWORK IS GOING TO [employer]. PT WANTS TO BE SEEN IN THE OFFICE BUT IS DOWNTOWN RIGHT NOW.   GAVE APPT WITH OSMAR AT [facility] AT 5:20PM.

## 2019-01-28 NOTE — PROGRESS NOTES
Problem visit. Did not work last week. First day back to work. Having back pain, constant. Had belly tightening for a couple of hours. Not sure if luis.   Will send to [facility] for monitoring

## 2019-01-29 ENCOUNTER — ROUTINE PRENATAL (OUTPATIENT)
Dept: OBGYN CLINIC | Facility: CLINIC | Age: 34
End: 2019-01-29

## 2019-01-29 ENCOUNTER — OFFICE VISIT (OUTPATIENT)
Dept: PHYSICAL THERAPY | Age: 34
End: 2019-01-29
Attending: OBSTETRICS & GYNECOLOGY
Payer: COMMERCIAL

## 2019-01-29 ENCOUNTER — TELEPHONE (OUTPATIENT)
Dept: ADMINISTRATIVE | Age: 34
End: 2019-01-29

## 2019-01-29 VITALS
SYSTOLIC BLOOD PRESSURE: 113 MMHG | BODY MASS INDEX: 27 KG/M2 | HEART RATE: 91 BPM | DIASTOLIC BLOOD PRESSURE: 68 MMHG | WEIGHT: 185 LBS

## 2019-01-29 DIAGNOSIS — Z34.93 ENCOUNTER FOR SUPERVISION OF NORMAL PREGNANCY IN THIRD TRIMESTER, UNSPECIFIED GRAVIDITY: Primary | ICD-10-CM

## 2019-01-29 LAB
MULTISTIX LOT#: NORMAL NUMERIC
PH, URINE: 8 (ref 4.5–8)
SPECIFIC GRAVITY: 1.01 (ref 1–1.03)

## 2019-01-29 PROCEDURE — 81002 URINALYSIS NONAUTO W/O SCOPE: CPT | Performed by: OBSTETRICS & GYNECOLOGY

## 2019-01-29 PROCEDURE — 97140 MANUAL THERAPY 1/> REGIONS: CPT

## 2019-01-29 PROCEDURE — 97110 THERAPEUTIC EXERCISES: CPT

## 2019-01-29 PROCEDURE — 97530 THERAPEUTIC ACTIVITIES: CPT

## 2019-01-29 NOTE — PROGRESS NOTES
Reviewed tracing with Dr Malgorzata Lyles. Orders for 1 liter bolus of LR. Cont EFM x 2 hr from time of arrival and recheck cervix.

## 2019-01-29 NOTE — PROGRESS NOTES
Dr Tilton Rubinstein in department. Notified her pt here, orders for cont EFM x 1 hr and recheck cervix.

## 2019-01-29 NOTE — TRIAGE
[facility]      Triage Note    Jeremi España Patient Status:  Observation    1985 MRN [id number]   Location [address]  Attending Claire Estrada MD   Hosp Day # 0 PCP MD Michell Hallman [facility] Minutes Between Contractions: 13 min           Acoustic Stimulator: No           Nonstress Test Interpretation: Reactive           Nonstress Test Second Interpretation: Reactive                      Additional Comments       Reason for visit: AT 34 3

## 2019-01-29 NOTE — PROGRESS NOTES
Pt is a 34year old female admitted to TR1/TR1-A. Patient presents with:  Contractions: Pt states her abd was hard intermittently earlier and was unsure if it was contractions. Just seen in office prior to arrival, cervix closed.       Pt is  34w3d

## 2019-01-29 NOTE — PROGRESS NOTES
Dx:      Back pain affecting pregnancy in third trimester (O99.89,M54.9)   Authorized # of Visits:  8 (HMO; Cert ends - 5/33/6689)        Next MD visit: none scheduled  Fall Risk: standard         Precautions: n/a           Medication Changes since last vi distraction (grades I-II)  - Sidelying: MFR at lumbar paraspinals -Sdly lumbar paraspinal release and STM x 15 min  -Sdly: lumbar paraspinal and upper gluteal release and STM x 15 min   Neuro Re-ed - gait training: engaging TrA     Therapeutic Activity - p

## 2019-01-29 NOTE — PROGRESS NOTES
Routine PN appt. Saw Roseann Collins yesterday for cramping. Went to [facility] for IVF and monitoring. Ctx resolved and feels well now. Continued LBP. Refusing tylenol and flexaril. Seeing PT . Requesting time off.   Encouraged to see chiropractor first.  She doesn't h

## 2019-01-31 ENCOUNTER — APPOINTMENT (OUTPATIENT)
Dept: PHYSICAL THERAPY | Age: 34
End: 2019-01-31
Attending: OBSTETRICS & GYNECOLOGY
Payer: COMMERCIAL

## 2019-02-01 ENCOUNTER — TELEPHONE (OUTPATIENT)
Dept: OBGYN CLINIC | Facility: CLINIC | Age: 34
End: 2019-02-01

## 2019-02-01 NOTE — TELEPHONE ENCOUNTER
Received call from [facility] from lab stating that lab received call from Chong Oliveira (phlebotomist supervisor) regarding several pts that they are unsure if they received the correct amount of glucola for their 3 hour gtt test. [facility] stated that this is one

## 2019-02-04 NOTE — PROGRESS NOTES
No S/S of PTL. We discussed her ongoing back pain. In visit with Dr Zack Boggs, she did not want to use tylenol, relaxer of PT. She has now started PT. There was an initial misunderstanding concerning FMLA.  Patient is asking for this due to frequent Dr and now P

## 2019-02-06 NOTE — TELEPHONE ENCOUNTER
Informed pt that she needs to retake the 3 hr gtt. Gave pt the fasting instructions. Gave pt the phone number to reschedule asap.

## 2019-02-09 ENCOUNTER — NURSE ONLY (OUTPATIENT)
Dept: OBGYN CLINIC | Facility: CLINIC | Age: 34
End: 2019-02-09

## 2019-02-09 ENCOUNTER — TELEPHONE (OUTPATIENT)
Dept: OBGYN CLINIC | Facility: CLINIC | Age: 34
End: 2019-02-09

## 2019-02-09 DIAGNOSIS — Z23 NEED FOR TDAP VACCINATION: Primary | ICD-10-CM

## 2019-02-09 PROCEDURE — 90471 IMMUNIZATION ADMIN: CPT | Performed by: OBSTETRICS & GYNECOLOGY

## 2019-02-09 PROCEDURE — 90715 TDAP VACCINE 7 YRS/> IM: CPT | Performed by: OBSTETRICS & GYNECOLOGY

## 2019-02-09 NOTE — TELEPHONE ENCOUNTER
No injection appts available today. Can pt have Tdap on Monday at 36w3d? Or should pt be squeezed in today?

## 2019-02-09 NOTE — TELEPHONE ENCOUNTER
She have Tdap today since she is 36 1/7 wk  I would not recommend high doses of Vit D during pregnancy-- don't have enough data. Her vit D level is insufficient, not critically low.   Take vit D [id number] units during pregnancy and take the high dose after delivery

## 2019-02-09 NOTE — PROGRESS NOTES
Patient if for Tdap, injection given on right deltoid, pt tolerated injection well. Reminded pt if any redness, hot to the touch or swelling to give  Us a call, pt understands, will be some soreness.

## 2019-02-09 NOTE — TELEPHONE ENCOUNTER
Pt states she will be in [address] today and was wondering if she can get her Tdap injection where she has her labs drawn so she doesn't have to bother us. PT informed that they do not give injections like that, and she would need to make an appt with us. Pt informed that the window for the Tdap is between 27 and 36 weeks. Pt is 36w1d. Pt informed that she may be too late. Message will be sent to [facility] no call to check if can still receive injection. Pt also states her pcp checked her Vit D and it was \"crazy low\". Pt was given an rx to take. Pt states her PNV has 20mcg of Vit D.  PCP gave the pt ergocalciferol 50,000 units. Pt would like us to check with the doctor and make sure this much vitamin D is safe in pregnancy.   Message to [facility] for recs of Tdap and Vit D.

## 2019-02-09 NOTE — TELEPHONE ENCOUNTER
Pt added to injection schedule today. Pt instructed to be here before 11am for injection. Pt states she will try. Pt also informed of HERBERTK's recs on taking Vit D. Pt instructed to just take pnv until after delivery. Pt expressed understanding.

## 2019-02-11 NOTE — TELEPHONE ENCOUNTER
Pt has provided std forms (short term disabilty) NOT FMLA. Contact with the pt. 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 She started her medical leave as of 1-29-19. Please see Dr. Elba Wu response at last prenatal visit. Patient states she cannot keep missing work 3 days per week, states she misses twice a week for therapy (back) and wkly OB appt. I suggested she schedule after work hours and she states she cannot. She also states the car ride to and from work hurt her back and she is unable to sleep at night. (She cried)      I agreed to hold onto forms until she has next visit, she needs to talk to the doctor to get approval to start medical leave early. Patient agreed to talk to provider re leave.       Medical leave forms (disability) on HOLD     Mackenzie

## 2019-02-12 ENCOUNTER — TELEPHONE (OUTPATIENT)
Dept: OBGYN CLINIC | Facility: CLINIC | Age: 34
End: 2019-02-12

## 2019-02-12 ENCOUNTER — ROUTINE PRENATAL (OUTPATIENT)
Dept: OBGYN CLINIC | Facility: CLINIC | Age: 34
End: 2019-02-12

## 2019-02-12 VITALS
BODY MASS INDEX: 27 KG/M2 | HEART RATE: 93 BPM | DIASTOLIC BLOOD PRESSURE: 74 MMHG | SYSTOLIC BLOOD PRESSURE: 118 MMHG | WEIGHT: 185.63 LBS

## 2019-02-12 DIAGNOSIS — Z34.93 ENCOUNTER FOR SUPERVISION OF NORMAL PREGNANCY IN THIRD TRIMESTER, UNSPECIFIED GRAVIDITY: Primary | ICD-10-CM

## 2019-02-12 LAB
MULTISTIX LOT#: NORMAL NUMERIC
PH, URINE: 7.5 (ref 4.5–8)
SPECIFIC GRAVITY: 1 (ref 1–1.03)
UROBILINOGEN,SEMI-QN: 0.2 MG/DL (ref 0–1.9)

## 2019-02-12 PROCEDURE — 81002 URINALYSIS NONAUTO W/O SCOPE: CPT | Performed by: OBSTETRICS & GYNECOLOGY

## 2019-02-12 NOTE — TELEPHONE ENCOUNTER
C/O'S UC'S WHEN WITH ACTIVITY. 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 STATES UC'S WHEN GOING TO GET WATER THAT NEEDS CONSTANTLY BECAUSE THEY ARE UNABLE TO CONTROL THE HEAT IN THE CLASS. I BOOKED AN APPT FOR PT TODAY BUT WILL CHECK WITH CAP (ON CALL) IF PT SHOULD GO RIGHT TO [facility]. PT SAID WE CAN LEAVE SPECIFIC VOICE MAIL INFO.

## 2019-02-12 NOTE — TELEPHONE ENCOUNTER
PER PT STATE SHE'S 36 WEEK / PT STATE HER BACK PAIN GOING DOWN TO HER BUTT / ALSO HAS TIGHTNESS IN HER ABD WHEN WALKING / THE SCHOOL IS VERY HOT LIKE 90 DEGREES / PLS ADV

## 2019-02-12 NOTE — TELEPHONE ENCOUNTER
Pt is 36w4d and reports having xavier george contractions on and off this morning. 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 States something is malfunctioning and she cannot turn down heat.  Reports she is drinking a lot of water, this

## 2019-02-12 NOTE — TELEPHONE ENCOUNTER
MESSAGE REVIEWED WITH CAP (ON CALL) AND IT IS OK FOR PT TO KEEP APPT WITH LYNETTE THIS AFTERNOON AND SHE SHOULD CONTINUE TO HYDRATE. LEFT MESSAGE WITH THIS INFO.

## 2019-02-12 NOTE — TELEPHONE ENCOUNTER
Pt is 36 weeks states she continues to have xavier george contractions lasting more than 30 minutes, wants to know at what point should she come in.

## 2019-02-13 NOTE — TELEPHONE ENCOUNTER
Dr. Allie Patino       Please sign off on form:  -Highlight the patient and hit \"Chart\" button. -In Chart Review, w/in the Encounter tab - click 1 time on the Telephone call encounter for_1-29-19 . Scroll down the telephone encounter.  -Click \"scan on\" blue Hyperlink under \"Media\" heading for _Dr. Jose Gardner / disability  2-13-19   w/in the telephone enc.  -Click on Acknowledge button at the bottom right corner and left-click onto image, signature stamp appears and drag signature to Provider signature line. Stamp will turn blue. Close window.     Thank you,  Catalino

## 2019-02-13 NOTE — TELEPHONE ENCOUNTER
Conversation with LYNETTE on 19, re medical leave. It started as intermittent time off and ended with consecutive due to fainting, previous  labor and back pain. Pt was seen yesterday and he took her off all work and provided her with a written note but did not make a copy. He agreed to cover medical leave and her last date worked was 19. He hand signed the form and I revised it and did NOT send the first one in this encounter.        MARGARITA

## 2019-02-15 ENCOUNTER — TELEPHONE (OUTPATIENT)
Dept: OBGYN CLINIC | Facility: CLINIC | Age: 34
End: 2019-02-15

## 2019-02-15 NOTE — TELEPHONE ENCOUNTER
PT NOTIFIED THAT PER MD MEETING THIS MORNING, PT STILL NEEDS TO DO 3 HOUR GTT ASAP. PT GIVEN PHONE NUMBER FOR CENTRAL SCHEDULING AND WILL TRY TO BOOK THE APPT RIGHT NOW.

## 2019-02-18 ENCOUNTER — ROUTINE PRENATAL (OUTPATIENT)
Dept: OBGYN CLINIC | Facility: CLINIC | Age: 34
End: 2019-02-18

## 2019-02-18 VITALS
DIASTOLIC BLOOD PRESSURE: 72 MMHG | SYSTOLIC BLOOD PRESSURE: 115 MMHG | WEIGHT: 187 LBS | HEART RATE: 81 BPM | BODY MASS INDEX: 27 KG/M2

## 2019-02-18 DIAGNOSIS — Z34.93 ENCOUNTER FOR SUPERVISION OF NORMAL PREGNANCY IN THIRD TRIMESTER, UNSPECIFIED GRAVIDITY: Primary | ICD-10-CM

## 2019-02-18 LAB
LEUKOCYTES: 2
MULTISTIX LOT#: NORMAL NUMERIC
PH, URINE: 7 (ref 4.5–8)
SPECIFIC GRAVITY: 1 (ref 1–1.03)
UROBILINOGEN,SEMI-QN: 0 MG/DL (ref 0–1.9)

## 2019-02-18 PROCEDURE — 81002 URINALYSIS NONAUTO W/O SCOPE: CPT | Performed by: OBSTETRICS & GYNECOLOGY

## 2019-02-18 NOTE — PROGRESS NOTES
B/l ear pressure. Tx a few weeks ago with zpak for otitis. Rec'd to see PCP. Still with LBP. Not going to PT but doing home exercises. Hasn't seen chiro as previous rec'd. Plans for repeat 3h GTT tomorrow with 3T labs. Has RCS scheduled. RTC 1 wk.

## 2019-02-19 ENCOUNTER — LABORATORY ENCOUNTER (OUTPATIENT)
Dept: LAB | Facility: HOSPITAL | Age: 34
End: 2019-02-19
Attending: OBSTETRICS & GYNECOLOGY
Payer: COMMERCIAL

## 2019-02-19 DIAGNOSIS — Z34.93 ENCOUNTER FOR SUPERVISION OF NORMAL PREGNANCY IN THIRD TRIMESTER, UNSPECIFIED GRAVIDITY: ICD-10-CM

## 2019-02-19 DIAGNOSIS — R73.09 ELEVATED GLUCOSE TOLERANCE TEST: ICD-10-CM

## 2019-02-19 PROBLEM — D69.1 THROMBOCYTOPATHIA (HCC): Status: ACTIVE | Noted: 2019-02-19

## 2019-02-19 LAB
DEPRECATED RDW RBC AUTO: 46.9 FL (ref 35.1–46.3)
ERYTHROCYTE [DISTWIDTH] IN BLOOD BY AUTOMATED COUNT: 13.3 % (ref 11–15)
GLUCOSE 1H P GLC SERPL-MCNC: 127 MG/DL
GLUCOSE 3H P GLC SERPL-MCNC: 125 MG/DL
GLUCOSE P FAST SERPL-MCNC: 79 MG/DL (ref 70–99)
GLUCOSE SERPL-MCNC: 128 MG/DL
HCT VFR BLD AUTO: 32.5 % (ref 35–48)
HGB BLD-MCNC: 10.6 G/DL (ref 12–16)
MCH RBC QN AUTO: 31.5 PG (ref 26–34)
MCHC RBC AUTO-ENTMCNC: 32.6 G/DL (ref 31–37)
MCV RBC AUTO: 96.7 FL (ref 80–100)
PLATELET # BLD AUTO: 136 10(3)UL (ref 150–450)
RBC # BLD AUTO: 3.36 X10(6)UL (ref 3.8–5.3)
WBC # BLD AUTO: 7.6 X10(3) UL (ref 4–11)

## 2019-02-19 PROCEDURE — 86780 TREPONEMA PALLIDUM: CPT

## 2019-02-19 PROCEDURE — 87389 HIV-1 AG W/HIV-1&-2 AB AG IA: CPT

## 2019-02-19 PROCEDURE — 82951 GLUCOSE TOLERANCE TEST (GTT): CPT

## 2019-02-19 PROCEDURE — 36415 COLL VENOUS BLD VENIPUNCTURE: CPT

## 2019-02-19 PROCEDURE — 82952 GTT-ADDED SAMPLES: CPT

## 2019-02-19 PROCEDURE — 85027 COMPLETE CBC AUTOMATED: CPT

## 2019-02-19 NOTE — PROGRESS NOTES
Pt advised of results and recs. Pt reports she is taking Slow Fe once daily. Advised pt to increase dose to twice daily. Also notified pt she passed the 3hr gtt.

## 2019-02-20 LAB — T PALLIDUM AB SER QL: NEGATIVE

## 2019-02-20 NOTE — PROGRESS NOTES
Dk at visit. GBS today. Crying at visit. 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 Not given today.

## 2019-02-24 ENCOUNTER — HOSPITAL ENCOUNTER (INPATIENT)
Facility: HOSPITAL | Age: 34
LOS: 3 days | Discharge: HOME | End: 2019-02-28
Attending: OBSTETRICS & GYNECOLOGY | Admitting: OBSTETRICS & GYNECOLOGY
Payer: COMMERCIAL

## 2019-02-24 RX ORDER — METOCLOPRAMIDE 10 MG/1
10 TABLET ORAL ONCE
Status: COMPLETED | OUTPATIENT
Start: 2019-02-25 | End: 2019-02-25

## 2019-02-24 RX ORDER — CLINDAMYCIN PHOSPHATE 900 MG/50ML
900 INJECTION INTRAVENOUS ONCE
Status: COMPLETED | OUTPATIENT
Start: 2019-02-25 | End: 2019-02-25

## 2019-02-24 RX ORDER — SODIUM CHLORIDE, SODIUM LACTATE, POTASSIUM CHLORIDE, CALCIUM CHLORIDE 600; 310; 30; 20 MG/100ML; MG/100ML; MG/100ML; MG/100ML
INJECTION, SOLUTION INTRAVENOUS
Status: COMPLETED
Start: 2019-02-24 | End: 2019-02-25

## 2019-02-24 RX ORDER — TRISODIUM CITRATE DIHYDRATE AND CITRIC ACID MONOHYDRATE 500; 334 MG/5ML; MG/5ML
30 SOLUTION ORAL ONCE
Status: DISCONTINUED | OUTPATIENT
Start: 2019-02-25 | End: 2019-02-25 | Stop reason: HOSPADM

## 2019-02-24 RX ORDER — TRISODIUM CITRATE DIHYDRATE AND CITRIC ACID MONOHYDRATE 500; 334 MG/5ML; MG/5ML
30 SOLUTION ORAL ONCE
Status: COMPLETED | OUTPATIENT
Start: 2019-02-25 | End: 2019-02-25

## 2019-02-24 RX ORDER — METOCLOPRAMIDE HYDROCHLORIDE 5 MG/ML
10 INJECTION INTRAMUSCULAR; INTRAVENOUS ONCE
Status: COMPLETED | OUTPATIENT
Start: 2019-02-25 | End: 2019-02-25

## 2019-02-24 RX ORDER — SODIUM CHLORIDE, SODIUM LACTATE, POTASSIUM CHLORIDE, CALCIUM CHLORIDE 600; 310; 30; 20 MG/100ML; MG/100ML; MG/100ML; MG/100ML
INJECTION, SOLUTION INTRAVENOUS CONTINUOUS
Status: DISCONTINUED | OUTPATIENT
Start: 2019-02-25 | End: 2019-02-25 | Stop reason: HOSPADM

## 2019-02-24 RX ORDER — SODIUM CHLORIDE 0.9 % (FLUSH) 0.9 %
10 SYRINGE (ML) INJECTION AS NEEDED
Status: DISCONTINUED | OUTPATIENT
Start: 2019-02-24 | End: 2019-02-25 | Stop reason: HOSPADM

## 2019-02-24 RX ORDER — FAMOTIDINE 20 MG/1
20 TABLET ORAL ONCE
Status: COMPLETED | OUTPATIENT
Start: 2019-02-25 | End: 2019-02-25

## 2019-02-24 RX ORDER — FAMOTIDINE 10 MG/ML
20 INJECTION, SOLUTION INTRAVENOUS ONCE
Status: COMPLETED | OUTPATIENT
Start: 2019-02-25 | End: 2019-02-25

## 2019-02-24 RX ORDER — ONDANSETRON 2 MG/ML
4 INJECTION INTRAMUSCULAR; INTRAVENOUS EVERY 6 HOURS PRN
Status: DISCONTINUED | OUTPATIENT
Start: 2019-02-24 | End: 2019-02-25 | Stop reason: HOSPADM

## 2019-02-25 ENCOUNTER — ANESTHESIA (OUTPATIENT)
Dept: OBGYN UNIT | Facility: HOSPITAL | Age: 34
End: 2019-02-25
Payer: COMMERCIAL

## 2019-02-25 ENCOUNTER — TELEPHONE (OUTPATIENT)
Dept: OBGYN CLINIC | Facility: CLINIC | Age: 34
End: 2019-02-25

## 2019-02-25 ENCOUNTER — ANESTHESIA EVENT (OUTPATIENT)
Dept: OBGYN UNIT | Facility: HOSPITAL | Age: 34
End: 2019-02-25
Payer: COMMERCIAL

## 2019-02-25 LAB
ANTIBODY SCREEN: NEGATIVE
BASOPHILS # BLD AUTO: 0.02 X10(3) UL (ref 0–0.2)
BASOPHILS # BLD AUTO: 0.05 X10(3) UL (ref 0–0.2)
BASOPHILS NFR BLD AUTO: 0.1 %
BASOPHILS NFR BLD AUTO: 0.6 %
DEPRECATED RDW RBC AUTO: 45.3 FL (ref 35.1–46.3)
DEPRECATED RDW RBC AUTO: 45.9 FL (ref 35.1–46.3)
EOSINOPHIL # BLD AUTO: 0 X10(3) UL (ref 0–0.7)
EOSINOPHIL # BLD AUTO: 0.08 X10(3) UL (ref 0–0.7)
EOSINOPHIL NFR BLD AUTO: 0 %
EOSINOPHIL NFR BLD AUTO: 1 %
ERYTHROCYTE [DISTWIDTH] IN BLOOD BY AUTOMATED COUNT: 13.4 % (ref 11–15)
ERYTHROCYTE [DISTWIDTH] IN BLOOD BY AUTOMATED COUNT: 13.5 % (ref 11–15)
HCT VFR BLD AUTO: 28.7 % (ref 35–48)
HCT VFR BLD AUTO: 32.6 % (ref 35–48)
HGB BLD-MCNC: 10.8 G/DL (ref 12–16)
HGB BLD-MCNC: 9.8 G/DL (ref 12–16)
IMM GRANULOCYTES # BLD AUTO: 0.16 X10(3) UL (ref 0–1)
IMM GRANULOCYTES # BLD AUTO: 0.3 X10(3) UL (ref 0–1)
IMM GRANULOCYTES NFR BLD: 1.1 %
IMM GRANULOCYTES NFR BLD: 3.6 %
LYMPHOCYTES # BLD AUTO: 0.6 X10(3) UL (ref 1–4)
LYMPHOCYTES # BLD AUTO: 1.63 X10(3) UL (ref 1–4)
LYMPHOCYTES NFR BLD AUTO: 19.5 %
LYMPHOCYTES NFR BLD AUTO: 4.3 %
MCH RBC QN AUTO: 31.1 PG (ref 26–34)
MCH RBC QN AUTO: 31.6 PG (ref 26–34)
MCHC RBC AUTO-ENTMCNC: 33.1 G/DL (ref 31–37)
MCHC RBC AUTO-ENTMCNC: 34.1 G/DL (ref 31–37)
MCV RBC AUTO: 92.6 FL (ref 80–100)
MCV RBC AUTO: 93.9 FL (ref 80–100)
MONOCYTES # BLD AUTO: 0.41 X10(3) UL (ref 0.1–1)
MONOCYTES # BLD AUTO: 0.66 X10(3) UL (ref 0.1–1)
MONOCYTES NFR BLD AUTO: 2.9 %
MONOCYTES NFR BLD AUTO: 7.9 %
NEUTROPHILS # BLD AUTO: 12.81 X10 (3) UL (ref 1.5–7.7)
NEUTROPHILS # BLD AUTO: 12.81 X10(3) UL (ref 1.5–7.7)
NEUTROPHILS # BLD AUTO: 5.64 X10 (3) UL (ref 1.5–7.7)
NEUTROPHILS # BLD AUTO: 5.64 X10(3) UL (ref 1.5–7.7)
NEUTROPHILS NFR BLD AUTO: 67.4 %
NEUTROPHILS NFR BLD AUTO: 91.6 %
PLATELET # BLD AUTO: 119 10(3)UL (ref 150–450)
PLATELET # BLD AUTO: 129 10(3)UL (ref 150–450)
RBC # BLD AUTO: 3.1 X10(6)UL (ref 3.8–5.3)
RBC # BLD AUTO: 3.47 X10(6)UL (ref 3.8–5.3)
RH BLOOD TYPE: POSITIVE
WBC # BLD AUTO: 14 X10(3) UL (ref 4–11)
WBC # BLD AUTO: 8.4 X10(3) UL (ref 4–11)

## 2019-02-25 PROCEDURE — 59510 CESAREAN DELIVERY: CPT | Performed by: OBSTETRICS & GYNECOLOGY

## 2019-02-25 PROCEDURE — 3E0134Z INTRODUCTION OF SERUM, TOXOID AND VACCINE INTO SUBCUTANEOUS TISSUE, PERCUTANEOUS APPROACH: ICD-10-PCS | Performed by: OBSTETRICS & GYNECOLOGY

## 2019-02-25 RX ORDER — HYDROCODONE BITARTRATE AND ACETAMINOPHEN 7.5; 325 MG/1; MG/1
1 TABLET ORAL EVERY 6 HOURS PRN
Status: ACTIVE | OUTPATIENT
Start: 2019-02-25 | End: 2019-02-26

## 2019-02-25 RX ORDER — DOCUSATE SODIUM 100 MG/1
100 CAPSULE, LIQUID FILLED ORAL
Status: DISCONTINUED | OUTPATIENT
Start: 2019-02-25 | End: 2019-02-28

## 2019-02-25 RX ORDER — PRENATAL VIT,CAL 76/IRON/FOLIC 29 MG-1 MG
1 TABLET ORAL DAILY
Status: DISCONTINUED | OUTPATIENT
Start: 2019-02-25 | End: 2019-02-28

## 2019-02-25 RX ORDER — KETOROLAC TROMETHAMINE 30 MG/ML
30 INJECTION, SOLUTION INTRAMUSCULAR; INTRAVENOUS EVERY 6 HOURS
Status: COMPLETED | OUTPATIENT
Start: 2019-02-25 | End: 2019-02-26

## 2019-02-25 RX ORDER — SODIUM CHLORIDE 0.9 % (FLUSH) 0.9 %
10 SYRINGE (ML) INJECTION AS NEEDED
Status: DISCONTINUED | OUTPATIENT
Start: 2019-02-25 | End: 2019-02-28

## 2019-02-25 RX ORDER — SODIUM PHOSPHATE, DIBASIC AND SODIUM PHOSPHATE, MONOBASIC 7; 19 G/133ML; G/133ML
1 ENEMA RECTAL ONCE AS NEEDED
Status: DISCONTINUED | OUTPATIENT
Start: 2019-02-25 | End: 2019-02-28

## 2019-02-25 RX ORDER — ACETAMINOPHEN 325 MG/1
650 TABLET ORAL EVERY 6 HOURS PRN
Status: ACTIVE | OUTPATIENT
Start: 2019-02-25 | End: 2019-02-26

## 2019-02-25 RX ORDER — HYDROCODONE BITARTRATE AND ACETAMINOPHEN 7.5; 325 MG/1; MG/1
2 TABLET ORAL EVERY 6 HOURS PRN
Status: ACTIVE | OUTPATIENT
Start: 2019-02-25 | End: 2019-02-26

## 2019-02-25 RX ORDER — SIMETHICONE 80 MG
80 TABLET,CHEWABLE ORAL 3 TIMES DAILY PRN
Status: DISCONTINUED | OUTPATIENT
Start: 2019-02-25 | End: 2019-02-28

## 2019-02-25 RX ORDER — ONDANSETRON 2 MG/ML
4 INJECTION INTRAMUSCULAR; INTRAVENOUS EVERY 6 HOURS PRN
Status: DISCONTINUED | OUTPATIENT
Start: 2019-02-25 | End: 2019-02-28

## 2019-02-25 RX ORDER — LIDOCAINE HYDROCHLORIDE 10 MG/ML
INJECTION, SOLUTION INFILTRATION; PERINEURAL
Status: COMPLETED | OUTPATIENT
Start: 2019-02-25 | End: 2019-02-25

## 2019-02-25 RX ORDER — DEXTROSE, SODIUM CHLORIDE, SODIUM LACTATE, POTASSIUM CHLORIDE, AND CALCIUM CHLORIDE 5; .6; .31; .03; .02 G/100ML; G/100ML; G/100ML; G/100ML; G/100ML
INJECTION, SOLUTION INTRAVENOUS
Status: DISPENSED
Start: 2019-02-25 | End: 2019-02-25

## 2019-02-25 RX ORDER — MORPHINE SULFATE 1 MG/ML
INJECTION, SOLUTION EPIDURAL; INTRATHECAL; INTRAVENOUS
Status: COMPLETED | OUTPATIENT
Start: 2019-02-25 | End: 2019-02-25

## 2019-02-25 RX ORDER — DEXTROSE, SODIUM CHLORIDE, SODIUM LACTATE, POTASSIUM CHLORIDE, AND CALCIUM CHLORIDE 5; .6; .31; .03; .02 G/100ML; G/100ML; G/100ML; G/100ML; G/100ML
INJECTION, SOLUTION INTRAVENOUS CONTINUOUS
Status: DISCONTINUED | OUTPATIENT
Start: 2019-02-25 | End: 2019-02-28

## 2019-02-25 RX ORDER — POLYETHYLENE GLYCOL 3350 17 G/17G
17 POWDER, FOR SOLUTION ORAL DAILY PRN
Status: DISCONTINUED | OUTPATIENT
Start: 2019-02-25 | End: 2019-02-28

## 2019-02-25 RX ORDER — NALOXONE HYDROCHLORIDE 0.4 MG/ML
0.08 INJECTION, SOLUTION INTRAMUSCULAR; INTRAVENOUS; SUBCUTANEOUS
Status: ACTIVE | OUTPATIENT
Start: 2019-02-25 | End: 2019-02-26

## 2019-02-25 RX ORDER — AMMONIA INHALANTS 0.04 G/.3ML
0.3 INHALANT RESPIRATORY (INHALATION) AS NEEDED
Status: DISCONTINUED | OUTPATIENT
Start: 2019-02-25 | End: 2019-02-28

## 2019-02-25 RX ORDER — DIPHENHYDRAMINE HYDROCHLORIDE 50 MG/ML
12.5 INJECTION INTRAMUSCULAR; INTRAVENOUS EVERY 4 HOURS PRN
Status: ACTIVE | OUTPATIENT
Start: 2019-02-25 | End: 2019-02-26

## 2019-02-25 RX ORDER — BUPIVACAINE HYDROCHLORIDE 7.5 MG/ML
INJECTION, SOLUTION INTRASPINAL
Status: COMPLETED | OUTPATIENT
Start: 2019-02-25 | End: 2019-02-25

## 2019-02-25 RX ORDER — BISACODYL 10 MG
10 SUPPOSITORY, RECTAL RECTAL
Status: DISCONTINUED | OUTPATIENT
Start: 2019-02-25 | End: 2019-02-28

## 2019-02-25 RX ORDER — HYDROMORPHONE HYDROCHLORIDE 1 MG/ML
0.4 INJECTION, SOLUTION INTRAMUSCULAR; INTRAVENOUS; SUBCUTANEOUS
Status: ACTIVE | OUTPATIENT
Start: 2019-02-25 | End: 2019-02-26

## 2019-02-25 RX ORDER — HYDROMORPHONE HYDROCHLORIDE 1 MG/ML
0.6 INJECTION, SOLUTION INTRAMUSCULAR; INTRAVENOUS; SUBCUTANEOUS
Status: ACTIVE | OUTPATIENT
Start: 2019-02-25 | End: 2019-02-26

## 2019-02-25 RX ORDER — KETOROLAC TROMETHAMINE 30 MG/ML
15 INJECTION, SOLUTION INTRAMUSCULAR; INTRAVENOUS ONCE
Status: COMPLETED | OUTPATIENT
Start: 2019-02-25 | End: 2019-02-25

## 2019-02-25 RX ORDER — ONDANSETRON 2 MG/ML
INJECTION INTRAMUSCULAR; INTRAVENOUS AS NEEDED
Status: DISCONTINUED | OUTPATIENT
Start: 2019-02-25 | End: 2019-02-25 | Stop reason: SURG

## 2019-02-25 RX ORDER — HYDROCODONE BITARTRATE AND ACETAMINOPHEN 7.5; 325 MG/1; MG/1
1 TABLET ORAL EVERY 4 HOURS PRN
Status: DISCONTINUED | OUTPATIENT
Start: 2019-02-26 | End: 2019-02-26

## 2019-02-25 RX ORDER — DIPHENHYDRAMINE HCL 25 MG
25 CAPSULE ORAL EVERY 4 HOURS PRN
Status: ACTIVE | OUTPATIENT
Start: 2019-02-25 | End: 2019-02-26

## 2019-02-25 RX ORDER — DEXAMETHASONE SODIUM PHOSPHATE 4 MG/ML
VIAL (ML) INJECTION AS NEEDED
Status: DISCONTINUED | OUTPATIENT
Start: 2019-02-25 | End: 2019-02-25 | Stop reason: SURG

## 2019-02-25 RX ORDER — NALBUPHINE HCL 10 MG/ML
2.5 AMPUL (ML) INJECTION EVERY 4 HOURS PRN
Status: ACTIVE | OUTPATIENT
Start: 2019-02-25 | End: 2019-02-26

## 2019-02-25 RX ORDER — HALOPERIDOL 5 MG/ML
0.5 INJECTION INTRAMUSCULAR ONCE AS NEEDED
Status: ACTIVE | OUTPATIENT
Start: 2019-02-25 | End: 2019-02-25

## 2019-02-25 RX ORDER — ONDANSETRON 2 MG/ML
4 INJECTION INTRAMUSCULAR; INTRAVENOUS ONCE AS NEEDED
Status: ACTIVE | OUTPATIENT
Start: 2019-02-25 | End: 2019-02-25

## 2019-02-25 RX ORDER — KETOROLAC TROMETHAMINE 30 MG/ML
30 INJECTION, SOLUTION INTRAMUSCULAR; INTRAVENOUS ONCE
Status: COMPLETED | OUTPATIENT
Start: 2019-02-25 | End: 2019-02-25

## 2019-02-25 RX ADMIN — SODIUM CHLORIDE, SODIUM LACTATE, POTASSIUM CHLORIDE, CALCIUM CHLORIDE: 600; 310; 30; 20 INJECTION, SOLUTION INTRAVENOUS at 02:16:00

## 2019-02-25 RX ADMIN — BUPIVACAINE HYDROCHLORIDE 1.5 ML: 7.5 INJECTION, SOLUTION INTRASPINAL at 02:04:00

## 2019-02-25 RX ADMIN — LIDOCAINE HYDROCHLORIDE 5 ML: 10 INJECTION, SOLUTION INFILTRATION; PERINEURAL at 02:04:00

## 2019-02-25 RX ADMIN — ONDANSETRON 4 MG: 2 INJECTION INTRAMUSCULAR; INTRAVENOUS at 01:50:00

## 2019-02-25 RX ADMIN — MORPHINE SULFATE 0.3 MG: 1 INJECTION, SOLUTION EPIDURAL; INTRATHECAL; INTRAVENOUS at 02:04:00

## 2019-02-25 RX ADMIN — SODIUM CHLORIDE, SODIUM LACTATE, POTASSIUM CHLORIDE, CALCIUM CHLORIDE: 600; 310; 30; 20 INJECTION, SOLUTION INTRAVENOUS at 01:23:00

## 2019-02-25 RX ADMIN — DEXAMETHASONE SODIUM PHOSPHATE 4 MG: 4 MG/ML VIAL (ML) INJECTION at 01:50:00

## 2019-02-25 NOTE — PROGRESS NOTES
Patient received into room 370 via [facility]. [facility] Patient [facility] to bed without incident. Bed in locked and low position. Side rails up x 2. VSS. Fundus firm at u/u, lochia small, no clots noted. IV site unremarkable.

## 2019-02-25 NOTE — ANESTHESIA PROCEDURE NOTES
Spinal Block  Performed by: Luella Ahumada, MD  Authorized by: Luella Ahumada, MD     Patient Location:  OR  Start Time:  2/25/2019 2:29 AM  End Time:  2/25/2019 2:35 AM  Reason for Block: surgical anesthesia    Anesthesiologist:  Luella Ahumada, MD  Performe

## 2019-02-25 NOTE — DISCHARGE SUMMARY
[facility]    Discharge Summary    Bridget España Patient Status:  Inpatient    1985 MRN [id number]   Location [address] Attending Blayne Arrington MD   [facility] Day # 0       Admission Date:  2019    Disch

## 2019-02-25 NOTE — ANESTHESIA POSTPROCEDURE EVALUATION
Patient: Bettie Canchola    Procedure Summary     Date:  19 Room / Location:  [address] [place] [address] L+D OR  [facility] L+D OR    Anesthesia Start:  0123 Anesthesia Stop:      Procedure:   SECTION (N/A ) Diagnosis:  (Repeat c sec, RAJAN 3/8/19)    Surgeon:  Ritesh Sharpe

## 2019-02-25 NOTE — PROGRESS NOTES
Pt is a 34year old female admitted to TR1/TR1-A. Patient presents with:  Contractions: since  every 10 minutes     Pt is  38w3d intra-uterine pregnancy. History obtained, consents signed. Oriented to room, staff, and plan of care.

## 2019-02-25 NOTE — TELEPHONE ENCOUNTER
PT WAS SCHEDULED FOR C-SX ON 3-1-19 BUT PT WAS DELIVERED THIS MORNING.   PLEASE NOTIFY THE ASSIST (RPW GROUP?)

## 2019-02-25 NOTE — H&P
[address] [facility] [address] [facility] Patient Status:  Observation    1985 MRN [id number]   Location [address] Attending Ca Turner MD   Hosp Day # 0 PCP Leanne Lundy MD     Date of Admis Living   3 1 1   1 1   SAB TAB Ectopic Multiple Live Births       1   1      # Outcome Date GA Lbr Andrey/2nd Weight Sex Delivery Anes PTL Lv   3 Current            2 Ectopic 03/01/18 6w0d          1 Term 06/21/16 39w0d  8 lb (3.629 kg) F Caesarean   GABY RASH      OBJECTIVE:    General: Alert and Oriented  Abdomen: Soft, Gravid    Cervix per RN  FT/thick/high    Ctx q 3-4 min  Fetal heart tones:  Baseline 130s   Fetal heart variability: moderate and reactive  Decelerations: No       Lab Review:  Results fo

## 2019-02-25 NOTE — LACTATION NOTE
LACTATION NOTE - MOTHER      Evaluation Type: Inpatient    Problems identified  Problems identified: Knowledge deficit    Maternal history  Maternal history:  section    Breastfeeding goal  Breastfeeding goal: To maintain breast milk feeding per pa

## 2019-02-25 NOTE — ANESTHESIA PREPROCEDURE EVALUATION
Anesthesia PreOp Note    HPI:     Singh Alaniz is a 34year old female who presents for preoperative consultation requested by: Livia Rodriguez MD    Date of Surgery: 2019    Procedure(s):   SECTION  Indication: Repeat C-se, RAJAN 3/8/19    Rele tablet by mouth daily. Disp:  Rfl:  2/24/2019 at Unknown time   PRENATAL 27-0.8 MG Oral Tab Take 1 tablet by mouth daily.  Disp:  Rfl:  2/24/2019 at Unknown time       Current Facility-Administered Medications Ordered in Epic:  Normal Saline Flush 0.9 % inj Transportation needs:        Medical: Not on file        Non-medical: Not on file    Tobacco Use      Smoking status: Never Smoker      Smokeless tobacco: Never Used    Substance and Sexual Activity      Alcohol use: No        Alcohol/week: 0.0 oz        C MCH 31.1 02/25/2019    MCHC 33.1 02/25/2019    RDW 13.5 02/25/2019    .0 (L) 02/25/2019    MPV 10.1 01/24/2019     Lab Results   Component Value Date     (L) 01/24/2019    K 3.9 01/24/2019     01/24/2019    CO2 22 01/24/2019    BUN 5 (L)

## 2019-02-25 NOTE — PROGRESS NOTES
Patient transferred to [facility] via cart in stable condition. Bedside report given to [employer].

## 2019-02-25 NOTE — OPERATIVE REPORT
[facility]OSP - [facility]     Section Delivery / Operative Note    Ana España Patient Status:  Inpatient    1985 MRN [id number]   Location [address] [facility] Attending Ronald Wade MD   Hosp Day # 0 PCP Kvng Joseph clamped and cut. The baby was handed off to the awaiting neonatologist.  The placenta was delivered spontaneously. The uterus was closed in two layer fashion. The first layer was closed with continuous locking stitch using a 0-Vicryl.   This was fol

## 2019-02-26 LAB
DEPRECATED RDW RBC AUTO: 46.3 FL (ref 35.1–46.3)
ERYTHROCYTE [DISTWIDTH] IN BLOOD BY AUTOMATED COUNT: 13.3 % (ref 11–15)
HCT VFR BLD AUTO: 27.1 % (ref 35–48)
HGB BLD-MCNC: 8.9 G/DL (ref 12–16)
MCH RBC QN AUTO: 31.4 PG (ref 26–34)
MCHC RBC AUTO-ENTMCNC: 32.8 G/DL (ref 31–37)
MCV RBC AUTO: 95.8 FL (ref 80–100)
PLATELET # BLD AUTO: 124 10(3)UL (ref 150–450)
RBC # BLD AUTO: 2.83 X10(6)UL (ref 3.8–5.3)
WBC # BLD AUTO: 8.4 X10(3) UL (ref 4–11)

## 2019-02-26 RX ORDER — HYDROCODONE BITARTRATE AND ACETAMINOPHEN 7.5; 325 MG/1; MG/1
1 TABLET ORAL EVERY 4 HOURS PRN
Status: DISCONTINUED | OUTPATIENT
Start: 2019-02-26 | End: 2019-02-28

## 2019-02-26 RX ORDER — IBUPROFEN 600 MG/1
600 TABLET ORAL EVERY 6 HOURS PRN
Status: DISCONTINUED | OUTPATIENT
Start: 2019-02-26 | End: 2019-02-28

## 2019-02-26 NOTE — PROGRESS NOTES
[facility]    OB/GYNE Progress Note      Scarlett España Patient Status:  Inpatient    1985 MRN [id number]   Location [facility] 3SE Attending Christopher Scott MD   Hosp Day # 1 PCP Josesito Conway MD       Subjective     Good pain

## 2019-02-26 NOTE — PLAN OF CARE
Sat with patient to review plan of care. Discussed analgesic options. Answered all questions. VSS. Breastfeeding on demand. Breastfeeding well. Voiding independently. Lochia is WNL. Uterus is firm.

## 2019-02-26 NOTE — PLAN OF CARE
ENRIQUETA WILL CONTINUE TO VOID FREELY, HAVE PAIN MANAGED, BE INDEPENDENT IN ADLS.   SHOWER AND BACK TO CHAIR THIS AM.

## 2019-02-27 RX ORDER — MELATONIN
325
Status: DISCONTINUED | OUTPATIENT
Start: 2019-02-28 | End: 2019-02-28

## 2019-02-27 NOTE — PLAN OF CARE
GASTROINTESTINAL - ADULT    • Minimal or absence of nausea and vomiting Completed    • Achieves appropriate nutritional intake (bariatric) Completed          ANXIETY    • Will report anxiety at manageable levels Progressing        GASTROINTESTINAL - ADULT

## 2019-02-27 NOTE — LACTATION NOTE
LACTATION NOTE - MOTHER      Evaluation Type: Inpatient    Problems identified  Problems identified: Knowledge deficit;Milk supply WNL    Maternal history  Maternal history:  section    Breastfeeding goal  Breastfeeding goal: To maintain breast mil

## 2019-02-27 NOTE — LACTATION NOTE
This note was copied from a baby's chart. LACTATION NOTE - INFANT    Evaluation Type  Evaluation Type: Inpatient    Problems & Assessment  Problems Diagnosed or Identified: Sleepy; Shallow latch  Problems: comment/detail: Does not have a consistent strong,

## 2019-02-27 NOTE — PLAN OF CARE
[facility] WILL HAVE LOWER BACK PAIN WELL MANAGED WITH MOTRIN AND Ioana Tinoco. WILL ENCOURAGE FREQUENT SHORT WALKS, WARM SHOWERS.

## 2019-02-27 NOTE — PROGRESS NOTES
[facility]    OB/Gyne Post  Section Progress Note      Ivis España Patient Status:  Inpatient    1985 MRN [id number]   Location [facility] 3SE Attending Kamran Pearce MD   Hosp Day # 2 PCP Angelica Mccarthy MD       [facility]

## 2019-02-28 ENCOUNTER — TELEPHONE (OUTPATIENT)
Dept: OBGYN CLINIC | Facility: CLINIC | Age: 34
End: 2019-02-28

## 2019-02-28 VITALS
DIASTOLIC BLOOD PRESSURE: 61 MMHG | RESPIRATION RATE: 16 BRPM | HEIGHT: 69 IN | SYSTOLIC BLOOD PRESSURE: 126 MMHG | HEART RATE: 76 BPM | WEIGHT: 188 LBS | TEMPERATURE: 98 F | BODY MASS INDEX: 27.85 KG/M2 | OXYGEN SATURATION: 100 %

## 2019-02-28 DIAGNOSIS — M54.5 LOW BACK PAIN, UNSPECIFIED BACK PAIN LATERALITY, UNSPECIFIED CHRONICITY, WITH SCIATICA PRESENCE UNSPECIFIED: Primary | ICD-10-CM

## 2019-02-28 RX ORDER — MELATONIN
325
Qty: 30 TABLET | Refills: 0 | Status: SHIPPED | OUTPATIENT
Start: 2019-03-01

## 2019-02-28 RX ORDER — HYDROCODONE BITARTRATE AND ACETAMINOPHEN 7.5; 325 MG/1; MG/1
1 TABLET ORAL EVERY 4 HOURS PRN
Qty: 20 TABLET | Refills: 0 | Status: SHIPPED | OUTPATIENT
Start: 2019-02-28

## 2019-02-28 NOTE — PROGRESS NOTES
[facility]    OB/Gyne Postpartum Progress Note      Nichole Bernard Patient Status:  Inpatient    1985 MRN [id number]   Location [facility] 3SE Attending Kristy Jenkins MD   Hosp Day # 3 PCP Arash German MD       Subjective

## 2019-02-28 NOTE — PLAN OF CARE
GASTROINTESTINAL - ADULT    • Maintains or returns to baseline bowel function Completed          PAIN - ADULT    • Verbalizes/displays adequate comfort level or patient's stated pain goal Progressing        POSTPARTUM    • Long Term Goal:Experiences normal

## 2019-02-28 NOTE — TELEPHONE ENCOUNTER
Please relay info:    Spoke to pt briefly before disconnection/phone going to [employer]. Referral entered and addressed as the patient requested. Patient can monitor approval via my chart. If she has any questions regarding approval pt should contact the [facility] department at [phone].

## 2019-02-28 NOTE — TELEPHONE ENCOUNTER
Fee waived   Form was not approved and later approved by LYNETTE.  (conflicting detaails)  No charge MARGARITA

## 2019-02-28 NOTE — TELEPHONE ENCOUNTER
Sent to Referral Coordinator and [facility]. Pt states that she had her baby on 2-25-19. Pt states that she discussed with JORGITO that she has lower back pain and wanted to see a chiropractor. Sent to [facility] if ok to see chiropractor and how long after birth. She would like a referral to Heidy Palmer.

## 2019-02-28 NOTE — LACTATION NOTE
LACTATION NOTE - MOTHER      Evaluation Type: Inpatient(Pt is being discharged home today.)    Problems identified  Problems identified: Knowledge deficit;Milk supply WNL    Maternal history  Maternal history:  section; Anemia  Other/comment: HX of

## 2019-02-28 NOTE — PROGRESS NOTES
Patient verbalizes comprehension of home care instructions and follow up care. Taking Motrin for pain. Ambulating with steady gait, voiding without difficulty. Breastfeeding well and frequently.   Escorted to car in wheelchair holding infant secured in c

## 2019-04-09 ENCOUNTER — POSTPARTUM (OUTPATIENT)
Dept: OBGYN CLINIC | Facility: CLINIC | Age: 34
End: 2019-04-09

## 2019-04-09 VITALS
WEIGHT: 165.81 LBS | SYSTOLIC BLOOD PRESSURE: 117 MMHG | BODY MASS INDEX: 24 KG/M2 | HEART RATE: 87 BPM | DIASTOLIC BLOOD PRESSURE: 73 MMHG

## 2019-04-09 NOTE — PROGRESS NOTES
DARYL Canas is a 34year old female  here for 6 week post-partum visit. Patient delivered a  female infant on 19 by repeat LTCS. Patient desires condoms for contraception. Patient is breast feeding.    Patient No symptoms of depre healed perineum  Anus: no hemorroids       ASSESSMENT/PLAN  Normal Post partum exam  RTC annual

## 2019-06-04 ENCOUNTER — TELEPHONE (OUTPATIENT)
Dept: OBGYN CLINIC | Facility: CLINIC | Age: 34
End: 2019-06-04

## 2019-06-04 NOTE — TELEPHONE ENCOUNTER
Pt requesting a return to work letter. Pt had her postpartum with MD on 4/9/19. [facility] 4/10/19.

## 2020-03-18 ENCOUNTER — NURSE TRIAGE (OUTPATIENT)
Dept: OTHER | Age: 35
End: 2020-03-18

## 2020-03-18 RX ORDER — AZITHROMYCIN 250 MG/1
TABLET, FILM COATED ORAL
Qty: 1 PACKAGE | Refills: 0 | Status: SHIPPED | OUTPATIENT
Start: 2020-03-18 | End: 2020-06-29 | Stop reason: ALTCHOICE

## 2020-03-18 NOTE — TELEPHONE ENCOUNTER
Spoke with the patient and informed her Dr. Kd Arana has ordered for her to start a Z-farooq. Patient voiced understanding and agreed with the plan of care.

## 2020-03-18 NOTE — TELEPHONE ENCOUNTER
Action Requested: Summary for Provider     []  Critical Lab, Recommendations Needed  [] Need Additional Advice  []   FYI    []   Need Orders  [] Need Medications Sent to Pharmacy  []  Other     SUMMARY: Pt seeking recommendations for sore throat/cough.

## 2020-06-16 ENCOUNTER — TELEPHONE (OUTPATIENT)
Dept: INTERNAL MEDICINE CLINIC | Facility: CLINIC | Age: 35
End: 2020-06-16

## 2020-06-16 ENCOUNTER — HOSPITAL ENCOUNTER (EMERGENCY)
Facility: HOSPITAL | Age: 35
Discharge: HOME | End: 2020-06-16
Attending: EMERGENCY MEDICINE
Payer: COMMERCIAL

## 2020-06-16 VITALS
OXYGEN SATURATION: 99 % | HEART RATE: 78 BPM | BODY MASS INDEX: 22.22 KG/M2 | SYSTOLIC BLOOD PRESSURE: 118 MMHG | DIASTOLIC BLOOD PRESSURE: 72 MMHG | TEMPERATURE: 99 F | WEIGHT: 150 LBS | HEIGHT: 69 IN | RESPIRATION RATE: 18 BRPM

## 2020-06-16 DIAGNOSIS — N30.91 HEMORRHAGIC CYSTITIS: Primary | ICD-10-CM

## 2020-06-16 PROCEDURE — 81025 URINE PREGNANCY TEST: CPT

## 2020-06-16 PROCEDURE — 87086 URINE CULTURE/COLONY COUNT: CPT | Performed by: EMERGENCY MEDICINE

## 2020-06-16 PROCEDURE — 99283 EMERGENCY DEPT VISIT LOW MDM: CPT

## 2020-06-16 PROCEDURE — 81001 URINALYSIS AUTO W/SCOPE: CPT | Performed by: EMERGENCY MEDICINE

## 2020-06-16 RX ORDER — CEPHALEXIN 500 MG/1
500 CAPSULE ORAL 3 TIMES DAILY
Qty: 15 CAPSULE | Refills: 0 | Status: SHIPPED | OUTPATIENT
Start: 2020-06-16 | End: 2020-06-21

## 2020-06-16 RX ORDER — PHENAZOPYRIDINE HYDROCHLORIDE 200 MG/1
200 TABLET, FILM COATED ORAL 3 TIMES DAILY PRN
Qty: 6 TABLET | Refills: 0 | Status: SHIPPED | OUTPATIENT
Start: 2020-06-16 | End: 2020-06-23

## 2020-06-16 NOTE — ED INITIAL ASSESSMENT (HPI)
C/o burning and increased frequency with urination since Saturday. Denies abd pain or taking pain medication pta.

## 2020-06-16 NOTE — ED PROVIDER NOTES
Patient Seen in: [facility] AND [facility] Emergency Department      History   Patient presents with:  Urinary Symptoms    Stated Complaint: uti    HPI    Patient is a 42-year-old female who presents with 2 days of dysuria urgency frequency and now gross hematur Resp 18   Ht 175.3 cm (5' 9\")   Wt 68 kg   SpO2 99%   BMI 22.15 kg/m²         Physical Exam  Vitals signs and nursing note reviewed. Constitutional:       General: She is not in acute distress. Appearance: Normal appearance. She is well-developed.  Tally Patel [id number]  [phone]    Schedule an appointment as soon as possible for a visit in 2 days  If symptoms worsen          Medications Prescribed:  Current Discharge Medication List    START taking these medications    cephALEXin 500 MG Oral Cap  Take 1 capsule

## 2020-06-16 NOTE — TELEPHONE ENCOUNTER
Message # [phone] 2020 04:48a [BONITAR]  To: Rashaun Miranda  From: Voncile Schaumann Primary MD: [address]  Phone#: [phone]  ----------------------------------------------------------------------  JUANITA PANDYA 89          (Message Delivered)  D

## 2020-06-22 ENCOUNTER — TELEPHONE (OUTPATIENT)
Dept: INTERNAL MEDICINE CLINIC | Facility: CLINIC | Age: 35
End: 2020-06-22

## 2020-06-22 NOTE — TELEPHONE ENCOUNTER
Patient would like to know if we provide testing for the [address] for COVID 19 and her insurance covers at 100%.

## 2020-06-22 NOTE — TELEPHONE ENCOUNTER
Pt was called and informed of Dr. Eben Llamas message below and she verbalized understanding. Thanks    Future Appointments   Date Time Provider Carla Torres   6/29/2020 12:00 PM Estephania Whitley MD [facility]

## 2020-06-22 NOTE — TELEPHONE ENCOUNTER
Spoke with the patient who reports Dr. Pantera Branch advised her to go to the ER on 6/16/20 due to UTI and dizziness. Patient reports she did go to the ER and was prescribed an antibiotic which she finished the cycle yesterday 6/21/20.  Patient reports she is feel

## 2020-06-22 NOTE — TELEPHONE ENCOUNTER
Yes she needs to see me for a follow-up–about 7 to 10 days from here. Okay to add into the day on a Monday or at lunchtime slot or any other open visit.

## 2020-06-26 NOTE — TELEPHONE ENCOUNTER
Contacted pt, informed of message below. Pt verbalized understanding. [address] you know where she can get antibody test done out of network.  Please advise

## 2020-06-29 ENCOUNTER — OFFICE VISIT (OUTPATIENT)
Dept: INTERNAL MEDICINE CLINIC | Facility: CLINIC | Age: 35
End: 2020-06-29

## 2020-06-29 VITALS
HEIGHT: 69 IN | BODY MASS INDEX: 22.26 KG/M2 | WEIGHT: 150.31 LBS | SYSTOLIC BLOOD PRESSURE: 110 MMHG | TEMPERATURE: 98 F | DIASTOLIC BLOOD PRESSURE: 71 MMHG | HEART RATE: 79 BPM | RESPIRATION RATE: 18 BRPM

## 2020-06-29 DIAGNOSIS — B37.3 VAGINAL CANDIDIASIS: Primary | ICD-10-CM

## 2020-06-29 PROBLEM — B37.31 VAGINAL CANDIDIASIS: Status: ACTIVE | Noted: 2020-06-29

## 2020-06-29 PROCEDURE — 3074F SYST BP LT 130 MM HG: CPT | Performed by: INTERNAL MEDICINE

## 2020-06-29 PROCEDURE — 3078F DIAST BP <80 MM HG: CPT | Performed by: INTERNAL MEDICINE

## 2020-06-29 PROCEDURE — 99213 OFFICE O/P EST LOW 20 MIN: CPT | Performed by: INTERNAL MEDICINE

## 2020-06-29 PROCEDURE — 3008F BODY MASS INDEX DOCD: CPT | Performed by: INTERNAL MEDICINE

## 2020-06-29 RX ORDER — FLUCONAZOLE 150 MG/1
TABLET ORAL
Qty: 2 TABLET | Refills: 0 | Status: SHIPPED | OUTPATIENT
Start: 2020-06-29

## 2020-06-29 NOTE — PATIENT INSTRUCTIONS
Problem List Items Addressed This Visit        Unprioritized    Vaginal candidiasis - Primary     Vaginal discharge, itchiness after recent treatment with Keflex. UTI has resolved. No blood in the urine. No fevers or chills, no back pain.   Pelvic exam n

## 2020-06-29 NOTE — PROGRESS NOTES
HPI:    Patient ID: Estrella Raymundo is a 30year old female. Note        Spoke with the patient who reports Dr. Katie Ward advised her to go to the ER on 6/16/20 due to UTI and dizziness.  Patient reports she did go to the ER and was prescribed an antibioti tablet by mouth every 4 (four) hours as needed. (Patient not taking: Reported on 6/29/2020 ) 20 tablet 0   • Ferrous Sulfate (SLOW FE OR) Take 1 tablet by mouth daily. • PRENATAL 27-0.8 MG Oral Tab Take 1 tablet by mouth daily.        Allergies:  Grapef resolved. Syncopal episode most likely due to vasovagal attack. Advised to avoid hot showers with suspicion of an infection. No follow-ups on file.     PT UNDERSTANDS AND AGREES TO FOLLOW DIRECTIONS AND ADVICE    No orders of the defined

## 2020-06-29 NOTE — ASSESSMENT & PLAN NOTE
Vaginal discharge, itchiness after recent treatment with Keflex. UTI has resolved. No blood in the urine. No fevers or chills, no back pain. Pelvic exam normal.  Diflucan 150 mg 1 tablet once a week–2 doses. Drink plenty of fluids.   Pregnancy precauti

## 2020-09-14 ENCOUNTER — NURSE TRIAGE (OUTPATIENT)
Dept: INTERNAL MEDICINE CLINIC | Facility: CLINIC | Age: 35
End: 2020-09-14

## 2020-09-14 NOTE — TELEPHONE ENCOUNTER
Action Requested: Summary for Provider     []  Critical Lab, Recommendations Needed  [] Need Additional Advice  []   FYI    []   Need Orders  [] Need Medications Sent to Pharmacy  []  Other     SUMMARY: Dr. Jaylene Cassidy: patient asked if abx can be sent.      Krystal Berkowitz

## 2020-09-14 NOTE — TELEPHONE ENCOUNTER
Edison message sent to pt advising her to call office    ----- Message from [address] [place] sent at 9/14/2020  9:45 AM CDT -----  Regarding: Non-Urgent Medical Question  Contact: [phone]  Dear Dr. Angelo Squires,    I’m getting another UTI.  Are there any n

## 2020-09-15 ENCOUNTER — LAB ENCOUNTER (OUTPATIENT)
Dept: LAB | Facility: HOSPITAL | Age: 35
End: 2020-09-15
Attending: INTERNAL MEDICINE
Payer: COMMERCIAL

## 2020-09-15 DIAGNOSIS — R30.0 DYSURIA: ICD-10-CM

## 2020-09-15 LAB
BILIRUB UR QL: NEGATIVE
CLARITY UR: CLEAR
COLOR UR: COLORLESS
GLUCOSE UR-MCNC: NEGATIVE MG/DL
HGB UR QL STRIP.AUTO: NEGATIVE
KETONES UR-MCNC: NEGATIVE MG/DL
LEUKOCYTE ESTERASE UR QL STRIP.AUTO: NEGATIVE
NITRITE UR QL STRIP.AUTO: NEGATIVE
PH UR: 7 [PH] (ref 5–8)
PROT UR-MCNC: NEGATIVE MG/DL
SP GR UR STRIP: 1 (ref 1–1.03)
UROBILINOGEN UR STRIP-ACNC: <2

## 2020-09-15 PROCEDURE — 87086 URINE CULTURE/COLONY COUNT: CPT

## 2020-09-15 PROCEDURE — 81003 URINALYSIS AUTO W/O SCOPE: CPT

## 2020-09-15 NOTE — TELEPHONE ENCOUNTER
Conversation: Non-Urgent Medical Question   (Newest Message First)   September 15, 2020   Carlo Ang MD   to Geovanny Mcbride           7:07 AM   [place],   Given recurrent urinary infections I would like a urine culture and sensitivity first and the

## 2020-12-18 ENCOUNTER — LAB ENCOUNTER (OUTPATIENT)
Dept: LAB | Facility: HOSPITAL | Age: 35
End: 2020-12-18
Attending: INTERNAL MEDICINE
Payer: COMMERCIAL

## 2020-12-18 ENCOUNTER — NURSE TRIAGE (OUTPATIENT)
Dept: INTERNAL MEDICINE CLINIC | Facility: CLINIC | Age: 35
End: 2020-12-18

## 2020-12-18 DIAGNOSIS — R30.0 DYSURIA: ICD-10-CM

## 2020-12-18 PROCEDURE — 87086 URINE CULTURE/COLONY COUNT: CPT

## 2020-12-18 PROCEDURE — 81001 URINALYSIS AUTO W/SCOPE: CPT

## 2020-12-18 RX ORDER — NITROFURANTOIN 25; 75 MG/1; MG/1
100 CAPSULE ORAL 2 TIMES DAILY
Qty: 14 CAPSULE | Refills: 0 | Status: SHIPPED | OUTPATIENT
Start: 2020-12-18

## 2020-12-18 NOTE — TELEPHONE ENCOUNTER
UA and culture and sensitivity–to be collected today. Macrobid 1 tablet p.o. twice daily for 7 days after urine is collected.

## 2020-12-18 NOTE — TELEPHONE ENCOUNTER
Action Requested: Summary for Provider     []  Critical Lab, Recommendations Needed  [x] Need Additional Advice  []   FYI    []   Need Orders  [] Need Medications Sent to Pharmacy  []  Other     SUMMARY: Dr. Jaylene Cassidy: patient seeking treatment.     Reason for

## 2020-12-18 NOTE — TELEPHONE ENCOUNTER
Spoke to patient and gave her provider's recommendations. Patient verbalized understanding and agreed to plan of care    Lab hours for [facility] given to patient    Antibiotic instructions reviewed with patient.      She will submit sample    UA and culture w

## 2023-11-30 NOTE — TELEPHONE ENCOUNTER
Message to Yahaira Forbes (on call) to please review pts PN labs and advise---any recs on Rubella? Comment states \"unable to determine the presence or absence of Rubella IgG antibody. Submit a new specimen in 1-2 weeks for repeat testing\". Render Post-Care Instructions In Note?: no Post-Care Instructions: I reviewed with the patient in detail post-care instructions. Patient is to wear sunprotection, and avoid picking at any of the treated lesions. Pt may apply Vaseline to crusted or scabbing areas. Application Tool (Optional): Liquid Nitrogen Sprayer Duration Of Freeze Thaw-Cycle (Seconds): 3 Number Of Freeze-Thaw Cycles: 3 freeze-thaw cycles Show Applicator Variable?: Yes Aperture Size (Optional): D Detail Level: Detailed Consent: The patient's consent was obtained including but not limited to risks of crusting, scabbing, blistering, scarring, darker or lighter pigmentary change, recurrence, incomplete removal and infection. Medical Necessity Information: It is in your best interest to select a reason for this procedure from the list below. All of these items fulfill various CMS LCD requirements except the new and changing color options. Medical Necessity Clause: This procedure was medically necessary because the lesions that were treated were: Spray Paint Text: The liquid nitrogen was applied to the skin utilizing a spray paint frosting technique.

## (undated) DEVICE — STERILE LATEX POWDER-FREE SURGICAL GLOVESWITH NITRILE COATING: Brand: PROTEXIS

## (undated) DEVICE — X-STREAM LAPAROSCOPIC IRRIGATION TUBING SET WITH NEZHAT-DORSEY TRUMPET VALVE, AND COJOINED SUCTION/IRRIGATION TUBING, WITHOUT PROBE TIP: Brand: X-STREAM

## (undated) DEVICE — DERMABOND LIQUID ADHESIVE

## (undated) DEVICE — COVER SGL STRL LGHT HNDL BLU

## (undated) DEVICE — TROCAR: Brand: KII FIOS FIRST ENTRY

## (undated) DEVICE — KENDALL SCD EXPRESS SLEEVES, KNEE LENGTH, MEDIUM: Brand: KENDALL SCD

## (undated) DEVICE — 60 ML SYRINGE LUER-LOCK TIP: Brand: MONOJECT

## (undated) DEVICE — SUTURE VICRYL 0 UR-6

## (undated) DEVICE — LAPAROSCOPY: Brand: MEDLINE INDUSTRIES, INC.

## (undated) DEVICE — SOL  .9 1000ML BTL

## (undated) DEVICE — LAP LIGASURE 5MM BLUNT

## (undated) DEVICE — C SECTION PACK: Brand: MEDLINE INDUSTRIES, INC.

## (undated) DEVICE — INSUFFLATION NEEDLE TO ESTABLISH PNEUMOPERITONEUM.: Brand: INSUFFLATION NEEDLE

## (undated) DEVICE — STERILE POLYISOPRENE POWDER-FREE SURGICAL GLOVES: Brand: PROTEXIS

## (undated) DEVICE — TISSUE RETRIEVAL SYSTEM: Brand: INZII RETRIEVAL SYSTEM

## (undated) DEVICE — SUTURE MONOCRYL 4-0 PS-2

## (undated) DEVICE — SPONGE LAP 18X18 XRAY STRL

## (undated) DEVICE — TROCAR: Brand: KII® SLEEVE

## (undated) DEVICE — VIOLET BRAIDED (POLYGLACTIN 910), SYNTHETIC ABSORBABLE SUTURE: Brand: COATED VICRYL

## (undated) DEVICE — SOL  .9 3000ML

## (undated) DEVICE — [HIGH FLOW INSUFFLATOR,  DO NOT USE IF PACKAGE IS DAMAGED,  KEEP DRY,  KEEP AWAY FROM SUNLIGHT,  PROTECT FROM HEAT AND RADIOACTIVE SOURCES.]: Brand: PNEUMOSURE

## (undated) DEVICE — REM POLYHESIVE ADULT PATIENT RETURN ELECTRODE: Brand: VALLEYLAB

## (undated) DEVICE — MANIPULATOR CATH ESCP KRNR

## (undated) DEVICE — NON-ADHERENT PAD PREPACK: Brand: TELFA

## (undated) DEVICE — TRAY CATH BDX IC 14FR 2L FL

## (undated) DEVICE — ABDOMINAL PAD: Brand: CURITY

## (undated) NOTE — LETTER
2019              [facility]            To Whom It May Concern,    Patient Jacki Noland,  [birth year], had her postpartum appointment with Dr. Moises Rodriguez on 19. Patient can return to work 4/10/19.           Sincerely,    Moises Rodriguez MD

## (undated) NOTE — LETTER
12/28/17        [address] [facility]  [address] [facility]      Dear Alonso Narayan,    [address] [facility] records indicate that you have outstanding lab work and or testing that was ordered for you and has not yet been completed:          Comp Metabolic Panel (14) [

## (undated) NOTE — LETTER
Celena Zhang [address] [facility], [facility] [place] [facility]    CONSENT FOR EXAMINATION AND DISPOSITION OF FETUS                    I hereby rivsa permission to [facility], to perform an examination of the fetus de Patient Name: Estrella Raymundo     : 1985                 Printed: 3/4/2018      Medical Record #: [id number]                                              Page 1 of 1

## (undated) NOTE — LETTER
12/28/17        [address] [facility]  [address] [facility]      Dear Kayla Hernandez,    [address] [facility] records indicate that you have outstanding lab work and or testing that was ordered for you and has not yet been completed:          Comp Metabolic Panel (14) [

## (undated) NOTE — LETTER
[facility] OF PUBLIC HEALTH DIVISION OF VITAL RECORDS FETAL DEATH DISPOSITION NOTIFICATION   This form shall be used to notify a mother of her disposition rights and options after experiencing aspontaneous fetal demise of less than 20 completed Printed: 3/4/2018      Medical Record #: [id number]                                              Page 1 of 1

## (undated) NOTE — LETTER
The [facility]  Scheduling the Birth of Your Sangeetha Rebolledo    You are scheduled for a  delivery on 3/1/19. You should arrive at the [facility] at 7:30am.      Please note to have labs drawn within 72 hours of your scheduled procedure date/time at the [facility] lab location. Please follow the enclosed antimicrobial wash instructions. This wash should be started 2 days prior to surgery and be continued until the day of surgery, for a total of 3 washes. Unless otherwise instructed by your physician, DO NOT eat or drink anything within 6 hours of your arrival to the [facility]. If you have not preregistered, please mail in your preregistration forms. The [facility] is located on the 3rd floor of [facility].  Please use the east elevators. When you arrive, you will be brought to your room and asked to change into a hospital gown. Your nurse will take your temperature, blood pressure, and pulse and place you on the fetal monitor to monitor the baby's well being and any uterine activity you may be experiencing prior to your delivery. Your nurse will also ask you some questions, start an intravenous (IV) line, and possibly draw some blood if your lab tests were not completed prior to your arrival.  You will also sign a consent for surgery. Your partner will be asked to change into scrubs so that he/she can be with you in the operating room for the birth of your child. There are no cameras or video cameras allowed in the operating room. You will be interviewed by the anesthesiologist, support stockings will be applied to your lower legs, and you will be shaved at the incision site. When surgery is completed, you and your partner will be taken to the recovery room for at least an hour prior to your return to your room.     Please feel free to contact the [facility] with any questions or concerns @ [phone].

## (undated) NOTE — LETTER
VACCINE ADMINISTRATION RECORD  PARENT / GUARDIAN APPROVAL  Date: 2019  Vaccine administered to: Estrella Raymundo     : 1985    MRN: [id number]    A copy of the appropriate Centers for Disease Control and Prevention Vaccine Information statement

## (undated) NOTE — LETTER
1/17/2019              Dunia Bobo 67         To Whom It May Concern,    Please excuse Adamrachel Vernon for next 2 days due to back pain in pregnancy.       Sincerely,    Dung Steele MD  [address]